# Patient Record
Sex: MALE | Race: WHITE | NOT HISPANIC OR LATINO | Employment: FULL TIME | URBAN - NONMETROPOLITAN AREA
[De-identification: names, ages, dates, MRNs, and addresses within clinical notes are randomized per-mention and may not be internally consistent; named-entity substitution may affect disease eponyms.]

---

## 2023-03-22 ENCOUNTER — OFFICE VISIT (OUTPATIENT)
Dept: FAMILY MEDICINE CLINIC | Facility: CLINIC | Age: 43
End: 2023-03-22

## 2023-03-22 VITALS
HEIGHT: 69 IN | OXYGEN SATURATION: 98 % | RESPIRATION RATE: 18 BRPM | HEART RATE: 71 BPM | SYSTOLIC BLOOD PRESSURE: 128 MMHG | DIASTOLIC BLOOD PRESSURE: 82 MMHG | WEIGHT: 284.4 LBS | TEMPERATURE: 98.3 F | BODY MASS INDEX: 42.12 KG/M2

## 2023-03-22 DIAGNOSIS — T65.91XA ACCIDENTAL INGESTION OF SUBSTANCE, INITIAL ENCOUNTER: ICD-10-CM

## 2023-03-22 DIAGNOSIS — K22.70 BARRETT'S ESOPHAGUS WITHOUT DYSPLASIA: ICD-10-CM

## 2023-03-22 DIAGNOSIS — M25.562 CHRONIC PAIN OF LEFT KNEE: ICD-10-CM

## 2023-03-22 DIAGNOSIS — F33.9 RECURRENT DEPRESSION (HCC): ICD-10-CM

## 2023-03-22 DIAGNOSIS — M54.50 CHRONIC BILATERAL LOW BACK PAIN WITHOUT SCIATICA: ICD-10-CM

## 2023-03-22 DIAGNOSIS — K42.9 UMBILICAL HERNIA WITHOUT OBSTRUCTION AND WITHOUT GANGRENE: ICD-10-CM

## 2023-03-22 DIAGNOSIS — F41.1 GAD (GENERALIZED ANXIETY DISORDER): ICD-10-CM

## 2023-03-22 DIAGNOSIS — Z13.29 SCREENING FOR THYROID DISORDER: ICD-10-CM

## 2023-03-22 DIAGNOSIS — G89.29 CHRONIC PAIN OF LEFT KNEE: ICD-10-CM

## 2023-03-22 DIAGNOSIS — Z23 ENCOUNTER FOR IMMUNIZATION: ICD-10-CM

## 2023-03-22 DIAGNOSIS — Z11.59 NEED FOR HEPATITIS C SCREENING TEST: ICD-10-CM

## 2023-03-22 DIAGNOSIS — Z76.89 ESTABLISHING CARE WITH NEW DOCTOR, ENCOUNTER FOR: Primary | ICD-10-CM

## 2023-03-22 DIAGNOSIS — Z13.220 SCREENING, LIPID: ICD-10-CM

## 2023-03-22 DIAGNOSIS — Z11.4 SCREENING FOR HIV (HUMAN IMMUNODEFICIENCY VIRUS): ICD-10-CM

## 2023-03-22 DIAGNOSIS — E66.01 MORBID OBESITY (HCC): ICD-10-CM

## 2023-03-22 DIAGNOSIS — G89.29 CHRONIC BILATERAL LOW BACK PAIN WITHOUT SCIATICA: ICD-10-CM

## 2023-03-22 DIAGNOSIS — K29.50 OTHER CHRONIC GASTRITIS WITHOUT HEMORRHAGE: ICD-10-CM

## 2023-03-22 PROBLEM — K29.70 GASTRITIS: Status: ACTIVE | Noted: 2023-03-22

## 2023-03-22 RX ORDER — ESCITALOPRAM OXALATE 20 MG/1
20 TABLET ORAL DAILY
COMMUNITY

## 2023-03-22 NOTE — LETTER
March 22, 2023     Patient: Dante Chadwick  YOB: 1980  Date of Visit: 3/22/2023      To Whom it May Concern:    Dante Chadwick is under my professional care  Chang Garrido was seen in my office on 3/22/2023  Chang Garrido may return to work on 3/27/22  If you have any questions or concerns, please don't hesitate to call           Sincerely,          Navjot Gould, DO
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
## 2023-03-24 ENCOUNTER — APPOINTMENT (OUTPATIENT)
Dept: RADIOLOGY | Facility: CLINIC | Age: 43
End: 2023-03-24

## 2023-03-24 ENCOUNTER — APPOINTMENT (OUTPATIENT)
Dept: LAB | Facility: CLINIC | Age: 43
End: 2023-03-24

## 2023-03-24 DIAGNOSIS — F33.9 RECURRENT DEPRESSION (HCC): ICD-10-CM

## 2023-03-24 DIAGNOSIS — G89.29 CHRONIC PAIN OF LEFT KNEE: ICD-10-CM

## 2023-03-24 DIAGNOSIS — G89.29 CHRONIC PAIN OF LEFT KNEE: Primary | ICD-10-CM

## 2023-03-24 DIAGNOSIS — M25.562 CHRONIC PAIN OF LEFT KNEE: ICD-10-CM

## 2023-03-24 DIAGNOSIS — M25.562 CHRONIC PAIN OF LEFT KNEE: Primary | ICD-10-CM

## 2023-03-24 DIAGNOSIS — Z13.29 SCREENING FOR THYROID DISORDER: ICD-10-CM

## 2023-03-24 DIAGNOSIS — E66.01 MORBID OBESITY (HCC): ICD-10-CM

## 2023-03-24 DIAGNOSIS — Z13.220 SCREENING, LIPID: ICD-10-CM

## 2023-03-24 DIAGNOSIS — K29.50 OTHER CHRONIC GASTRITIS WITHOUT HEMORRHAGE: ICD-10-CM

## 2023-03-24 DIAGNOSIS — F41.1 GAD (GENERALIZED ANXIETY DISORDER): ICD-10-CM

## 2023-03-24 LAB
ALBUMIN SERPL BCP-MCNC: 3.8 G/DL (ref 3.5–5)
ALP SERPL-CCNC: 61 U/L (ref 46–116)
ALT SERPL W P-5'-P-CCNC: 39 U/L (ref 12–78)
ANION GAP SERPL CALCULATED.3IONS-SCNC: 4 MMOL/L (ref 4–13)
AST SERPL W P-5'-P-CCNC: 15 U/L (ref 5–45)
BASOPHILS # BLD AUTO: 0.08 THOUSANDS/ÂΜL (ref 0–0.1)
BASOPHILS NFR BLD AUTO: 2 % (ref 0–1)
BILIRUB SERPL-MCNC: 0.54 MG/DL (ref 0.2–1)
BUN SERPL-MCNC: 16 MG/DL (ref 5–25)
CALCIUM SERPL-MCNC: 8.7 MG/DL (ref 8.3–10.1)
CHLORIDE SERPL-SCNC: 108 MMOL/L (ref 96–108)
CHOLEST SERPL-MCNC: 119 MG/DL
CO2 SERPL-SCNC: 26 MMOL/L (ref 21–32)
CREAT SERPL-MCNC: 0.84 MG/DL (ref 0.6–1.3)
EOSINOPHIL # BLD AUTO: 0.21 THOUSAND/ÂΜL (ref 0–0.61)
EOSINOPHIL NFR BLD AUTO: 4 % (ref 0–6)
ERYTHROCYTE [DISTWIDTH] IN BLOOD BY AUTOMATED COUNT: 12.4 % (ref 11.6–15.1)
GFR SERPL CREATININE-BSD FRML MDRD: 106 ML/MIN/1.73SQ M
GLUCOSE P FAST SERPL-MCNC: 99 MG/DL (ref 65–99)
HCT VFR BLD AUTO: 47.2 % (ref 36.5–49.3)
HDLC SERPL-MCNC: 59 MG/DL
HGB BLD-MCNC: 15.7 G/DL (ref 12–17)
IMM GRANULOCYTES # BLD AUTO: 0.02 THOUSAND/UL (ref 0–0.2)
IMM GRANULOCYTES NFR BLD AUTO: 0 % (ref 0–2)
LDLC SERPL CALC-MCNC: 46 MG/DL (ref 0–100)
LYMPHOCYTES # BLD AUTO: 1.39 THOUSANDS/ÂΜL (ref 0.6–4.47)
LYMPHOCYTES NFR BLD AUTO: 26 % (ref 14–44)
MCH RBC QN AUTO: 29.8 PG (ref 26.8–34.3)
MCHC RBC AUTO-ENTMCNC: 33.3 G/DL (ref 31.4–37.4)
MCV RBC AUTO: 90 FL (ref 82–98)
MONOCYTES # BLD AUTO: 0.78 THOUSAND/ÂΜL (ref 0.17–1.22)
MONOCYTES NFR BLD AUTO: 15 % (ref 4–12)
NEUTROPHILS # BLD AUTO: 2.83 THOUSANDS/ÂΜL (ref 1.85–7.62)
NEUTS SEG NFR BLD AUTO: 53 % (ref 43–75)
NRBC BLD AUTO-RTO: 0 /100 WBCS
PLATELET # BLD AUTO: 293 THOUSANDS/UL (ref 149–390)
PMV BLD AUTO: 9.7 FL (ref 8.9–12.7)
POTASSIUM SERPL-SCNC: 4.2 MMOL/L (ref 3.5–5.3)
PROT SERPL-MCNC: 6.9 G/DL (ref 6.4–8.4)
RBC # BLD AUTO: 5.26 MILLION/UL (ref 3.88–5.62)
SODIUM SERPL-SCNC: 138 MMOL/L (ref 135–147)
TRIGL SERPL-MCNC: 69 MG/DL
TSH SERPL DL<=0.05 MIU/L-ACNC: 1.28 UIU/ML (ref 0.45–4.5)
WBC # BLD AUTO: 5.31 THOUSAND/UL (ref 4.31–10.16)

## 2023-04-06 ENCOUNTER — TELEPHONE (OUTPATIENT)
Dept: FAMILY MEDICINE CLINIC | Facility: CLINIC | Age: 43
End: 2023-04-06

## 2023-04-06 NOTE — TELEPHONE ENCOUNTER
Pt wife asking if you can call her regarding her husbands hospital stay/surgery  Shes confused and has questions

## 2023-04-06 NOTE — TELEPHONE ENCOUNTER
Please let her know I am out of office until next week and will phone her upon my return -- next Thursday    Wai Castillo, DO

## 2023-04-14 NOTE — TELEPHONE ENCOUNTER
I personally phoned her  She has f/u with ortho and needs Rheumatology f/u  RF was positive -- needs to see Rheumatology  She will discuss this with ortho at their f/u visit  He can see me in May as scheduled  Sleep Referral done        Rosalina Yepez,

## 2023-05-11 ENCOUNTER — RA CDI HCC (OUTPATIENT)
Dept: OTHER | Facility: HOSPITAL | Age: 43
End: 2023-05-11

## 2023-05-11 NOTE — PROGRESS NOTES
[facility]  coding opportunities       Chart reviewed, no opportunity found: CHART REVIEWED, NO OPPORTUNITY FOUND        Patients Insurance        Commercial Insurance: [employer]

## 2023-05-18 ENCOUNTER — OFFICE VISIT (OUTPATIENT)
Dept: FAMILY MEDICINE CLINIC | Facility: CLINIC | Age: 43
End: 2023-05-18

## 2023-05-18 VITALS
OXYGEN SATURATION: 99 % | WEIGHT: 290.4 LBS | BODY MASS INDEX: 42.88 KG/M2 | DIASTOLIC BLOOD PRESSURE: 82 MMHG | TEMPERATURE: 98.6 F | HEART RATE: 79 BPM | RESPIRATION RATE: 18 BRPM | SYSTOLIC BLOOD PRESSURE: 122 MMHG

## 2023-05-18 DIAGNOSIS — F33.9 RECURRENT DEPRESSION (HCC): ICD-10-CM

## 2023-05-18 DIAGNOSIS — K22.70 BARRETT'S ESOPHAGUS WITHOUT DYSPLASIA: ICD-10-CM

## 2023-05-18 DIAGNOSIS — R73.03 PRE-DIABETES: ICD-10-CM

## 2023-05-18 DIAGNOSIS — Z11.4 SCREENING FOR HIV (HUMAN IMMUNODEFICIENCY VIRUS): ICD-10-CM

## 2023-05-18 DIAGNOSIS — E66.01 MORBID OBESITY (HCC): ICD-10-CM

## 2023-05-18 DIAGNOSIS — Z00.00 ANNUAL PHYSICAL EXAM: Primary | ICD-10-CM

## 2023-05-18 DIAGNOSIS — Z12.5 SCREENING FOR MALIGNANT NEOPLASM OF PROSTATE: ICD-10-CM

## 2023-05-18 DIAGNOSIS — F41.1 GAD (GENERALIZED ANXIETY DISORDER): ICD-10-CM

## 2023-05-18 DIAGNOSIS — Z80.42 FAMILY HISTORY OF PROSTATE CANCER: ICD-10-CM

## 2023-05-18 DIAGNOSIS — T65.91XS: ICD-10-CM

## 2023-05-18 RX ORDER — IBUPROFEN 600 MG/1
600 TABLET ORAL EVERY 6 HOURS PRN
COMMUNITY
Start: 2023-04-22 | End: 2023-05-18 | Stop reason: ALTCHOICE
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
## 2023-05-20 ENCOUNTER — TELEPHONE (OUTPATIENT)
Dept: OTHER | Facility: OTHER | Age: 43
End: 2023-05-20

## 2023-05-20 DIAGNOSIS — F98.8 ATTENTION DEFICIT DISORDER (ADD) WITHOUT HYPERACTIVITY: Primary | ICD-10-CM

## 2023-05-20 NOTE — TELEPHONE ENCOUNTER
Patient would like a call back from the doctor to discuss a new medication Strattera (Atomoxetine) he would to try

## 2023-05-24 RX ORDER — ATOMOXETINE 40 MG/1
40 CAPSULE ORAL DAILY
Qty: 30 CAPSULE | Refills: 1 | Status: SHIPPED | OUTPATIENT
Start: 2023-05-24

## 2023-05-24 NOTE — TELEPHONE ENCOUNTER
"I spoke to pt  Having difficulty with focus and attn  Has been like this \"my whole life  \"  Would like consideration for Strattera  Medication sent to the pharmacy  Staff, please phone him later this afternoon - he is a   I should see him for f/u in early July        Tamra [address], DO    "

## 2023-06-16 ENCOUNTER — TELEPHONE (OUTPATIENT)
Dept: FAMILY MEDICINE CLINIC | Facility: CLINIC | Age: 43
End: 2023-06-16

## 2023-06-16 NOTE — LETTER
June 16, 2023     Patient: Unique Mao  YOB: 1980  Date of Visit: 6/16/2023      To Whom it May Concern:    Unique Mao is under my professional care  Elton Tess was seen in my office on 6/16/2023  Elton Pulido may return to work on 6/16/23   Excuse him from Tues-Thursday 6/13-6/15 due to illness  If you have any questions or concerns, please don't hesitate to call           Sincerely,          Luisa Art, DO

## 2023-06-16 NOTE — TELEPHONE ENCOUNTER
Pt calling asking for a note for work  Missed work Tues-Thursday 6/13-6/15  States he had the flu and didn't come in because he knew it was viral but [facility] is asking for a note       Asking if note can be faxed to [address] [facility] at [phone]

## 2023-07-05 ENCOUNTER — OFFICE VISIT (OUTPATIENT)
Dept: FAMILY MEDICINE CLINIC | Facility: CLINIC | Age: 43
End: 2023-07-05
Payer: COMMERCIAL

## 2023-07-05 VITALS
WEIGHT: 292.2 LBS | SYSTOLIC BLOOD PRESSURE: 128 MMHG | TEMPERATURE: 98.3 F | RESPIRATION RATE: 18 BRPM | BODY MASS INDEX: 43.15 KG/M2 | DIASTOLIC BLOOD PRESSURE: 88 MMHG | HEART RATE: 84 BPM | OXYGEN SATURATION: 98 %

## 2023-07-05 DIAGNOSIS — F98.8 ATTENTION DEFICIT DISORDER (ADD) WITHOUT HYPERACTIVITY: Primary | ICD-10-CM

## 2023-07-05 DIAGNOSIS — F33.9 RECURRENT DEPRESSION (HCC): ICD-10-CM

## 2023-07-05 DIAGNOSIS — F41.1 GAD (GENERALIZED ANXIETY DISORDER): ICD-10-CM

## 2023-07-05 DIAGNOSIS — M17.12 ARTHRITIS OF LEFT KNEE: ICD-10-CM

## 2023-07-05 PROCEDURE — 99214 OFFICE O/P EST MOD 30 MIN: CPT | Performed by: FAMILY MEDICINE

## 2023-07-05 RX ORDER — ATOMOXETINE 18 MG/1
18 CAPSULE ORAL DAILY
Qty: 30 CAPSULE | Refills: 2 | Status: SHIPPED | OUTPATIENT
Start: 2023-07-05

## 2023-07-05 RX ORDER — ATOMOXETINE 40 MG/1
40 CAPSULE ORAL EVERY MORNING
Qty: 90 CAPSULE | Refills: 1 | Status: SHIPPED | OUTPATIENT
Start: 2023-07-05

## 2023-07-05 NOTE — PATIENT INSTRUCTIONS
Knee Exercises   AMBULATORY CARE:   What you need to know about knee exercises:  Knee exercises help strengthen the muscles around your knee. Strong muscles can help reduce pain and decrease your risk of future injury. Knee exercises also help you heal after an injury or surgery. These are beginning exercises. Ask your healthcare provider if you need to see a physical therapist for more advanced exercises. General guidelines for knee exercises:   Start slowly. As you get stronger, you may be able to do more sets of each exercise or add weights. Stop if you feel pain. It is normal to feel some discomfort at first, but you should not feel pain. Tell your provider or physical therapist if you have pain while you exercise. Regular exercise will help decrease your discomfort over time. Do the exercises on both legs. Do this so both knees remain strong. Warm up before you do knee exercises. Walk or ride a stationary bike for 5 or 10 minutes to warm your muscles. How to perform knee stretches safely:  Always stretch before you do strengthening exercises. Do these stretching exercises again after you do the strengthening exercises. Do these stretches 4 or 5 days a week, or as directed. Standing calf stretch: Face a wall and place both palms flat on the wall, or hold the back of a chair for balance. Keep a slight bend in your knees. Take a big step backward with one leg. Keep your other leg directly under you. Keep both heels flat and press your hips forward. Hold the stretch for 30 seconds, and then relax for 30 seconds. Switch legs. Repeat 2 or 3 times on each leg. Standing quadriceps stretch:  Stand and place one hand against a wall or hold the back of a chair for balance. With your weight on one leg, bend your other leg and grab your ankle. Bring your heel toward your buttocks. Hold the stretch for 30 to 60 seconds. Switch legs. Repeat 2 or 3 times on each leg.          Sitting hamstring stretch:  Sit with both legs straight in front of you. Do not point or flex your toes. Place your palms on the floor and slide your hands forward until you feel the stretch. Do not round your back. Hold the stretch for 30 seconds. Repeat 2 or 3 times. How to perform knee strengthening exercises safely:  Do these exercises 4 or 5 days a week, or as directed. Standing half squats:  Stand with your feet shoulder-width apart. Lean your back against a wall or hold the back of a chair for balance, if needed. Slowly sit down about 10 inches, as if you are going to sit in a chair. Your body weight should be mostly over your heels. Hold the squat for 5 seconds, then rise to a standing position. Do 3 sets of 10 squats to strengthen your buttocks and thighs. Standing hamstring curls: Face a wall and place both palms flat on the wall, or hold the back of a chair for balance. With your weight on one leg, lift your other foot as close to your buttocks as you can. Hold for 5 seconds and then lower your leg. Do 2 sets of 10 curls on each leg. This exercise strengthens the muscles in the back of your thigh. Standing calf raises:  Face a wall and place both palms flat on the wall, or hold the back of a chair for balance. Stand up straight, and do not lean. Place all your weight on one leg by lifting the other foot off the floor. Raise the heel of the foot that is on the floor as high as you can and then lower it. Do 2 sets of 10 calf raises on each leg to strengthen your calf muscles. Straight leg lifts:  Lie on your stomach with straight legs. Fold your arms in front of you and rest your head in your arms. Tighten your leg muscles and raise one leg as high as you can. Hold for 5 seconds, then lower your leg. Do 2 sets of 10 lifts on each leg to strengthen your buttocks. Sitting leg lifts:  Sit in a chair. Slowly straighten and raise one leg. Squeeze your thigh muscles and hold for 5 seconds. Relax and return your foot to the floor. Do 2 sets of 10 lifts on each leg. This helps strengthen the muscles in the front of your thigh. Call your doctor or physical therapist if:   You have new pain or your pain becomes worse. You have questions or concerns about your condition or care. © Copyright Holley Pierce 2022 Information is for End User's use only and may not be sold, redistributed or otherwise used for commercial purposes. The above information is an  only. It is not intended as medical advice for individual conditions or treatments. Talk to your doctor, nurse or pharmacist before following any medical regimen to see if it is safe and effective for you.

## 2023-07-05 NOTE — PROGRESS NOTES
[address] [facility]    NAME: Radha Bueno  AGE: 38 y.o. SEX: male  : 1980     DATE: 2023     Assessment and Plan:     1. Attention deficit disorder (ADD) without hyperactivity  - symptoms improved. Will bump to 40 in AM and 18 mg in the afternoon. He is having some urinary hesitancy, nothing significant at the moment. Will back off if worsening.    - atoMOXetine (STRATTERA) 18 mg capsule; Take 1 capsule (18 mg total) by mouth daily Late afternoon/early evening  Dispense: 30 capsule; Refill: 2  - atoMOXetine (STRATTERA) 40 mg capsule; Take 1 capsule (40 mg total) by mouth every morning  Dispense: 90 capsule; Refill: 1    2. Recurrent depression ([address])  Stable on lexapro. 3. AIYANA (generalized anxiety disorder)  Stable on lexapro. No si/hi. 4. Arthritis of left knee  - XR on file. Nothing significant on exam today. He is give a Home Exercise Plan. He is more active now working at [employer] and this has been helpful for him as well. We will cont to monitor. 3 month f/u for f/u ADHD and L knee pain     Patient/Caretaker verbalized understanding and were in agreement with today's assessment and plan. Time was taken to address any questions patient/caretaker had. Indication/Risks/Benefits of medication(s) as prescribed were discussed with the patient/caretaker. The patient verbalized understanding and agreement and elects to take medications as prescribed. Time was taken to answer any questions the patient/caretaker may have had. BMI Counseling: There is no height or weight on file to calculate BMI. The BMI is above normal. Nutrition recommendations include decreasing portion sizes, encouraging healthy choices of fruits and vegetables, decreasing fast food intake, consuming healthier snacks, limiting drinks that contain sugar, reducing intake of saturated and trans fat and reducing intake of cholesterol.  Exercise recommendations include exercising 3-5 times per week and strength training exercises. Rationale for BMI follow-up plan is due to patient being overweight or obese. Chief Complaint:     Chief Complaint   Patient presents with   • Follow-up      History of Present Illness:     Is following up with Rheum for his R shoulder. Still doing PT for this. Has appt with Gen Sx for his umbilical hernia - recommended weight loss prior to sx. He is working on this. No significant pain at the moment. Will check in f/u about Sleep referral.      GI appt later this month. [place] No changes to report in his bowel  He does have h/o Paul's. He does feel bloating. No discrete heartburn. No melena. ADHD - started Strattera in May. Feels some urinary hesitancy and dec flow in his stream but overall notes improvement in his symptoms. Would like to cont. Open to dosing in afternoon/evening. 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 L knee pain -- not a new c/o'. Had XR done. Feels weak and like it will give way at times. Better now that he Is more active with his job. Worse when a lot of up and down and twisting. No color change or swelling. Did have an injury many years ago where he twisted the knee. Not waking him up from sleep. Diet and Physical Activity  · Diet/Nutrition: working on this. · Exercise: walking, being more active      Depression Screening  PHQ-2/9 Depression Screening         General Health  · Sleep: lexapro helping him and melatonin prn. · Hearing: there is a ringing in his hears -- this is chronic in nature. · Vision: vision problems: wearing glasses; no major changes. · Dental: regular dental visits.    Has an appt in July -- will have extractions     [facility]  · Symptoms include: none     Review of Systems:     Review of Systems   All other systems reviewed and are negative. Past Medical History:     Past Medical History:   Diagnosis Date   • Anxiety    • Depression       Past Surgical History:     Past Surgical History:   Procedure Laterality Date   • APPENDECTOMY  2019   • HERNIA REPAIR     • SHOULDER SURGERY        Family History:     Family History   Problem Relation Age of Onset   • Breast cancer Mother    • Diabetes Mother    • Bone cancer Father    • Heart attack Paternal Grandmother    • Breast cancer Maternal Aunt       Social History:     Social History     Socioeconomic History   • Marital status: /Civil Union     Spouse name: None   • Number of children: None   • Years of education: None   • Highest education level: None   Occupational History   • None   Tobacco Use   • Smoking status: Never   • Smokeless tobacco: Never   Vaping Use   • Vaping Use: Never used   Substance and Sexual Activity   • Alcohol use: Not Currently   • Drug use: Never   • Sexual activity: Yes     Partners: Female   Other Topics Concern   • None   Social History Narrative   • None     Social Determinants of Health     Financial Resource Strain: Not on file   Food Insecurity: Not on file   Transportation Needs: Not on file   Physical Activity: Not on file   Stress: Not on file   Social Connections: Not on file   Intimate Partner Violence: Not on file   Housing Stability: Not on file      Current Medications:     Current Outpatient Medications   Medication Sig Dispense Refill   • atoMOXetine (STRATTERA) 18 mg capsule Take 1 capsule (18 mg total) by mouth daily Late afternoon/early evening 30 capsule 2   • atoMOXetine (STRATTERA) 40 mg capsule Take 1 capsule (40 mg total) by mouth every morning 90 capsule 1   • escitalopram (LEXAPRO) 20 mg tablet Take 20 mg by mouth daily       No current facility-administered medications for this visit.       Allergies:     No Known Allergies   Physical Exam:     /88   Pulse 84   Temp 98.3 °F (36.8 °C) (Temporal)   Resp 18   Wt 133 kg (292 lb 3.2 oz)   SpO2 98%   BMI 43.15 kg/m²     Physical Exam  Vitals and nursing note reviewed. Constitutional:       Appearance: He is obese. HENT:      Head: Normocephalic and atraumatic. Eyes:      Conjunctiva/sclera: Conjunctivae normal.      Pupils: Pupils are equal, round, and reactive to light. Cardiovascular:      Rate and Rhythm: Normal rate and regular rhythm. Heart sounds: Normal heart sounds. Pulmonary:      Effort: Pulmonary effort is normal.      Breath sounds: Normal breath sounds. No wheezing, rhonchi or rales. Abdominal:      General: Bowel sounds are normal.      Palpations: Abdomen is soft. Musculoskeletal:      Cervical back: Neck supple. Comments: There is ttp over the Lateral Joint line of L knee  No gross abnormality to inspection b/l knee  There is FROM though more painful with near end range flexion  Strength intact to b/l LE  2+ DP pulses  There is some palpable grinding with patellar testing; other special testing wnl    Lymphadenopathy:      Cervical: No cervical adenopathy. Skin:     General: Skin is warm and dry. Neurological:      General: No focal deficit present. Mental Status: He is alert and oriented to person, place, and time. Psychiatric:         Mood and Affect: Mood normal.         Behavior: Behavior normal.         Thought Content:  Thought content normal.         Judgment: Judgment normal.          Emily Cid, DO  [facility]

## 2023-07-13 ENCOUNTER — OFFICE VISIT (OUTPATIENT)
Dept: FAMILY MEDICINE CLINIC | Facility: CLINIC | Age: 43
End: 2023-07-13
Payer: COMMERCIAL

## 2023-07-13 VITALS
RESPIRATION RATE: 18 BRPM | BODY MASS INDEX: 42.71 KG/M2 | HEART RATE: 92 BPM | OXYGEN SATURATION: 99 % | WEIGHT: 289.2 LBS | TEMPERATURE: 98 F | DIASTOLIC BLOOD PRESSURE: 80 MMHG | SYSTOLIC BLOOD PRESSURE: 126 MMHG

## 2023-07-13 DIAGNOSIS — H93.13 TINNITUS OF BOTH EARS: ICD-10-CM

## 2023-07-13 DIAGNOSIS — H61.22 IMPACTED CERUMEN OF LEFT EAR: Primary | ICD-10-CM

## 2023-07-13 DIAGNOSIS — H91.93 BILATERAL HEARING LOSS, UNSPECIFIED HEARING LOSS TYPE: ICD-10-CM

## 2023-07-13 PROCEDURE — 99214 OFFICE O/P EST MOD 30 MIN: CPT | Performed by: FAMILY MEDICINE

## 2023-07-13 PROCEDURE — 69209 REMOVE IMPACTED EAR WAX UNI: CPT | Performed by: FAMILY MEDICINE

## 2023-07-13 NOTE — PROGRESS NOTES
Name: Maria Eugenia Soni      : 1980      MRN: [id number]  Encounter Provider: Tamika Dong DO  Encounter Date: 2023   Encounter department: [address]     1. Impacted cerumen of left ear  - removal done successfully. Tolerated well. 2. Tinnitus of both ears  Will send to audiology. Had in [address] [place] [address] but nothing here and it is persistent/worsening a bit in terms of hearing loss. - Ambulatory Referral to Audiology; Future    3. Bilateral hearing loss, unspecified hearing loss type  - Ambulatory Referral to Audiology; Future    Ear cerumen removal    Date/Time: 2023 10:00 AM    Performed by: Tamika Dong DO  Authorized by: Tamika Dong DO  Universal Protocol:  Consent: Verbal consent obtained. Risks and benefits: risks, benefits and alternatives were discussed  Consent given by: patient  Time out: Immediately prior to procedure a "time out" was called to verify the correct patient, procedure, equipment, support staff and site/side marked as required. Patient understanding: patient states understanding of the procedure being performed  Patient consent: the patient's understanding of the procedure matches consent given  Site marked: the operative site was marked  Patient identity confirmed: verbally with patient      Patient location:  Clinic  Procedure details:     Location:  L ear    Procedure type: irrigation only      Approach:  Natural orifice  Post-procedure details:     Complication:  None    Hearing quality:  Improved    Patient tolerance of procedure: Tolerated well, no immediate complications      Return for keep appt as scheduled . Patient/Caretaker verbalized understanding and were in agreement with today's assessment and plan. Time was taken to address any questions patient/caretaker had. Indication/Risks/Benefits of medication(s) as prescribed were discussed with the patient/caretaker.   The patient verbalized understanding and agreement and elects to take medications as prescribed. Time was taken to answer any questions the patient/caretaker may have had. Chief Complaint   Patient presents with   • Earache       Subjective      As at the pool at [facility] and since then, has a blocked ear. This is on the L. Tried to remove at home  Not successful. Feels there is something in there. No f/c/s. Has h/o tinnitus and hearing loss. Worsening a bit. Would like to see Audiology here. Review of Systems   All other systems reviewed and are negative. Current Outpatient Medications on File Prior to Visit   Medication Sig   • atoMOXetine (STRATTERA) 18 mg capsule Take 1 capsule (18 mg total) by mouth daily Late afternoon/early evening   • atoMOXetine (STRATTERA) 40 mg capsule Take 1 capsule (40 mg total) by mouth every morning   • escitalopram (LEXAPRO) 20 mg tablet Take 20 mg by mouth daily       Objective     /80   Pulse 92   Temp 98 °F (36.7 °C) (Temporal)   Resp 18   Wt 131 kg (289 lb 3.2 oz)   SpO2 99%   BMI 42.71 kg/m²     Physical Exam  Vitals and nursing note reviewed. Constitutional:       Appearance: Normal appearance. HENT:      Head: Normocephalic and atraumatic. Right Ear: Tympanic membrane and ear canal normal.      Ears:      Comments: L cerumen impaction  Post removal, normal TM    Fluid behind TMs b/l   Eyes:      Conjunctiva/sclera: Conjunctivae normal.      Pupils: Pupils are equal, round, and reactive to light. Cardiovascular:      Rate and Rhythm: Normal rate. Pulmonary:      Effort: Pulmonary effort is normal.   Musculoskeletal:      Cervical back: Neck supple. Neurological:      General: No focal deficit present. Mental Status: He is alert and oriented to person, place, and time. Psychiatric:         Mood and Affect: Mood normal.         Behavior: Behavior normal.         Thought Content:  Thought content normal.         Judgment: Judgment normal. [facility],

## 2023-10-05 ENCOUNTER — OFFICE VISIT (OUTPATIENT)
Dept: FAMILY MEDICINE CLINIC | Facility: CLINIC | Age: 43
End: 2023-10-05
Payer: COMMERCIAL

## 2023-10-05 VITALS
RESPIRATION RATE: 18 BRPM | TEMPERATURE: 97.8 F | SYSTOLIC BLOOD PRESSURE: 130 MMHG | WEIGHT: 292.2 LBS | DIASTOLIC BLOOD PRESSURE: 82 MMHG | OXYGEN SATURATION: 96 % | HEART RATE: 93 BPM | BODY MASS INDEX: 43.15 KG/M2

## 2023-10-05 DIAGNOSIS — R06.83 SNORING: ICD-10-CM

## 2023-10-05 DIAGNOSIS — R40.0 DAYTIME SOMNOLENCE: ICD-10-CM

## 2023-10-05 DIAGNOSIS — E55.9 VITAMIN D DEFICIENCY: ICD-10-CM

## 2023-10-05 DIAGNOSIS — F98.8 ATTENTION DEFICIT DISORDER (ADD) WITHOUT HYPERACTIVITY: ICD-10-CM

## 2023-10-05 DIAGNOSIS — F41.1 GAD (GENERALIZED ANXIETY DISORDER): ICD-10-CM

## 2023-10-05 DIAGNOSIS — M17.12 ARTHRITIS OF LEFT KNEE: ICD-10-CM

## 2023-10-05 DIAGNOSIS — F33.9 RECURRENT DEPRESSION (HCC): Primary | ICD-10-CM

## 2023-10-05 DIAGNOSIS — R73.03 PRE-DIABETES: ICD-10-CM

## 2023-10-05 PROCEDURE — 99214 OFFICE O/P EST MOD 30 MIN: CPT | Performed by: FAMILY MEDICINE

## 2023-10-05 RX ORDER — ERGOCALCIFEROL 1.25 MG/1
50000 CAPSULE ORAL 2 TIMES WEEKLY
Qty: 24 CAPSULE | Refills: 0 | Status: SHIPPED | OUTPATIENT
Start: 2023-10-05 | End: 2023-12-26

## 2023-10-05 NOTE — LETTER
October 5, 2023     Patient: Selene Mendez  YOB: 1980  Date of Visit: 10/5/2023      To Whom it May Concern:    Stacey Gutiérrez is under my professional care. Ottis Litten was seen in my office on 10/5/2023. Ottis Litten may return to work on 10/9/23 . Please excuse him on today's date and tomorrow, 10/6/23. If you have any questions or concerns, please don't hesitate to call.          Sincerely,          Judy Salas, DO

## 2023-10-05 NOTE — PROGRESS NOTES
[address] [facility]    NAME: Francisco Coates  AGE: 38 y.o. SEX: male  : 1980     DATE: 10/5/2023     Assessment and Plan:     1. Vitamin D deficiency  - significantly low on labs done in the Spring. Will supplement high dose twice weekly. He is on board. Can be rechecked in a few months. - ergocalciferol (VITAMIN D2) 50,000 units; Take 1 capsule (50,000 Units total) by mouth 2 (two) times a week for 24 doses  Dispense: 24 capsule; Refill: 0    2. Snoring  Agreeable to sleep study. He is with fatigue/not feeling well rested. Gaining weight, etc.  Certainly at risk. - Ambulatory Referral to Sleep Medicine; Future    3. Daytime somnolence  - Ambulatory Referral to Sleep Medicine; Future    4. Attention deficit disorder (ADD) without hyperactivity  On Strattera and this has been helpful for him. Was having some urinary hesitancy symptoms, this is a bit better with the higher dose. Will cont to monitor. 5. AIYANA (generalized anxiety disorder)  Stressed of late. 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 Is gaining weight. Will cont lexapro at current dose. Will also get sleep study. 6. Recurrent depression ([address])  See above. 7. Pre-diabetes  Lab Results   Component Value Date    HGBA1C 5.7 (H) 2023   - Discussed the importance of maintaining a healthy lifestyle through heart healthy diet and exercise. 8. Arthritis of left knee  - improving. Return in about 3 months (around 2024) for est with Dr. Sheila Salas in [facility] . Patient/Caretaker verbalized understanding and were in agreement with today's assessment and plan. Time was taken to address any questions patient/caretaker had. Indication/Risks/Benefits of medication(s) as prescribed were discussed with the patient/caretaker.   The patient verbalized understanding and agreement and elects to take medications as prescribed. Time was taken to answer any questions the patient/caretaker may have had. BMI Counseling: Body mass index is 43.15 kg/m². The BMI is above normal. Nutrition recommendations include decreasing portion sizes, encouraging healthy choices of fruits and vegetables, decreasing fast food intake, consuming healthier snacks, limiting drinks that contain sugar, reducing intake of saturated and trans fat and reducing intake of cholesterol. Exercise recommendations include exercising 3-5 times per week and strength training exercises. Rationale for BMI follow-up plan is due to patient being overweight or obese. Chief Complaint:     Chief Complaint   Patient presents with   • Follow-up      History of Present Illness:     He had Covid shot early this week. He is feeling under the weather a bit today as well; he has some pnd. He is also with nausea. Is out of work and does not feel up to returning tomorrow. Just feeling a bit more down of late. Wife with him today. No f/c/s. He is gaining weight and is not feeling good about this. Admits his diet could be better. No intentional exercise. Plans to work on this. Saw Gen surgery for his umbilical hernia. Not surgical at the moment. No pain to report. He is agreeable to see sleep medicine. He is snoring, feeling excess daily fatigue. Did do referral in the past, not followed up on. Agreeable to revisit this. ADHD - started Strattera in May. Was feeling some urinary hesitancy and dec flow in his stream but overall notes improvement in his symptoms on the higher dose. Would like to cont. 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 L knee is feeling improved. XR on file. General Health  · Sleep: lexapro helping him and melatonin prn. · Hearing: there is a ringing in his hears -- this is chronic in nature. · Vision: vision problems: wearing glasses; no major changes.     · Dental: regular dental visits. Review of Systems:     Review of Systems   All other systems reviewed and are negative. Past Medical History:     Past Medical History:   Diagnosis Date   • Anxiety    • Depression       Past Surgical History:     Past Surgical History:   Procedure Laterality Date   • APPENDECTOMY  2019   • HERNIA REPAIR     • SHOULDER SURGERY        Family History:     Family History   Problem Relation Age of Onset   • Breast cancer Mother    • Diabetes Mother    • Bone cancer Father    • Heart attack Paternal Grandmother    • Breast cancer Maternal Aunt       Social History:     Social History     Socioeconomic History   • Marital status: /Civil Union     Spouse name: None   • Number of children: None   • Years of education: None   • Highest education level: None   Occupational History   • None   Tobacco Use   • Smoking status: Never   • Smokeless tobacco: Never   Vaping Use   • Vaping Use: Never used   Substance and Sexual Activity   • Alcohol use: Not Currently   • Drug use: Never   • Sexual activity: Yes     Partners: Female   Other Topics Concern   • None   Social History Narrative   • None     Social Determinants of Health     Financial Resource Strain: Not on file   Food Insecurity: Not on file   Transportation Needs: Not on file   Physical Activity: Not on file   Stress: Not on file   Social Connections: Not on file   Intimate Partner Violence: Not on file   Housing Stability: Not on file      Current Medications:     Current Outpatient Medications   Medication Sig Dispense Refill   • atoMOXetine (STRATTERA) 40 mg capsule Take 1 capsule (40 mg total) by mouth every morning 90 capsule 1   • ergocalciferol (VITAMIN D2) 50,000 units Take 1 capsule (50,000 Units total) by mouth 2 (two) times a week for 24 doses 24 capsule 0   • escitalopram (LEXAPRO) 20 mg tablet Take 20 mg by mouth daily       No current facility-administered medications for this visit.       Allergies:     No Known Allergies   Physical Exam:     /82   Pulse 93   Temp 97.8 °F (36.6 °C) (Temporal)   Resp 18   Wt 133 kg (292 lb 3.2 oz)   SpO2 96%   BMI 43.15 kg/m²     Physical Exam  Vitals and nursing note reviewed. Constitutional:       Appearance: He is obese. HENT:      Head: Normocephalic and atraumatic. Mouth/Throat:      Pharynx: Oropharynx is clear. Eyes:      Conjunctiva/sclera: Conjunctivae normal.      Pupils: Pupils are equal, round, and reactive to light. Neck:      Comments: Short, thick neck    Cardiovascular:      Rate and Rhythm: Normal rate and regular rhythm. Heart sounds: Normal heart sounds. Pulmonary:      Effort: Pulmonary effort is normal.      Breath sounds: Normal breath sounds. No wheezing, rhonchi or rales. Abdominal:      General: Bowel sounds are normal.      Palpations: Abdomen is soft. Musculoskeletal:         General: No swelling. Cervical back: Neck supple. Lymphadenopathy:      Cervical: No cervical adenopathy. Skin:     General: Skin is warm and dry. Neurological:      General: No focal deficit present. Mental Status: He is alert and oriented to person, place, and time. Psychiatric:         Mood and Affect: Mood normal.         Behavior: Behavior normal.         Thought Content:  Thought content normal.         Judgment: Judgment normal.          Emily Simon, DO  [address] [facility]

## 2023-12-24 DIAGNOSIS — E55.9 VITAMIN D DEFICIENCY: ICD-10-CM

## 2023-12-26 RX ORDER — ERGOCALCIFEROL 1.25 MG/1
CAPSULE ORAL
Qty: 24 CAPSULE | Refills: 0 | Status: SHIPPED | OUTPATIENT
Start: 2023-12-26

## 2023-12-29 ENCOUNTER — OFFICE VISIT (OUTPATIENT)
Dept: FAMILY MEDICINE CLINIC | Facility: CLINIC | Age: 43
End: 2023-12-29
Payer: COMMERCIAL

## 2023-12-29 VITALS
RESPIRATION RATE: 18 BRPM | HEART RATE: 94 BPM | DIASTOLIC BLOOD PRESSURE: 82 MMHG | TEMPERATURE: 97.4 F | OXYGEN SATURATION: 97 % | SYSTOLIC BLOOD PRESSURE: 128 MMHG

## 2023-12-29 DIAGNOSIS — J06.9 UPPER RESPIRATORY TRACT INFECTION, UNSPECIFIED TYPE: Primary | ICD-10-CM

## 2023-12-29 PROCEDURE — 99213 OFFICE O/P EST LOW 20 MIN: CPT

## 2023-12-29 RX ORDER — ALBUTEROL SULFATE 90 UG/1
2 AEROSOL, METERED RESPIRATORY (INHALATION) EVERY 6 HOURS PRN
Qty: 8 G | Refills: 0 | Status: SHIPPED | OUTPATIENT
Start: 2023-12-29

## 2023-12-29 RX ORDER — PREDNISONE 10 MG/1
TABLET ORAL DAILY
Qty: 15 TABLET | Refills: 0 | Status: SHIPPED | OUTPATIENT
Start: 2023-12-29 | End: 2024-01-03

## 2023-12-29 NOTE — PROGRESS NOTES
Name: Tristan Espino      : 1980      MRN: [id number]  Encounter Provider: Dmitriy Simpson PA-C  Encounter Date: 2023   Encounter department: [facility]    Assessment & Plan     1. Upper respiratory tract infection, unspecified type  Assessment & Plan:  Patient prescribed prednisone taper over 5 days and albuterol.  Patient is educated on side effects of each medication is told to call the office if these present.  Patient is also told to finish full course of amoxicillin.  Patient is educated on signs and symptoms of lower respiratory tract disease, is told to go to emergency department or call office if these present.  Patient is told to follow-up with office if symptoms persist or worsen in 1 week.    Orders:  -     predniSONE 10 mg tablet; Take 5 tablets (50 mg total) by mouth daily for 1 day, THEN 4 tablets (40 mg total) daily for 1 day, THEN 3 tablets (30 mg total) daily for 1 day, THEN 2 tablets (20 mg total) daily for 1 day, THEN 1 tablet (10 mg total) daily for 1 day.  -     albuterol (Ventolin HFA) 90 mcg/act inhaler; Inhale 2 puffs every 6 (six) hours as needed for wheezing       I have spent a total time of 20 minutes on 23 in caring for this patient including Prognosis, Risks and benefits of tx options, Instructions for management, Patient and family education, Importance of tx compliance, Risk factor reductions, Documenting in the medical record, Reviewing / ordering tests, medicine, procedures  , and Obtaining or reviewing history  .      Subjective      Patient is a 44yo male presenting with symptoms of a URI for 10 days.  Saturday went to urgent care.  Given amoxicillin 875mg bid for 10 days.      URI   This is a new problem. The current episode started 1 to 4 weeks ago (10 days ago.). The problem has been gradually worsening. There has been no fever. Associated symptoms include congestion, coughing, rhinorrhea, sinus pain, a sore throat and wheezing.  Pertinent negatives include no abdominal pain, chest pain, diarrhea, dysuria, ear pain, headaches, joint pain, joint swelling, nausea, neck pain, plugged ear sensation, rash, sneezing, swollen glands or vomiting. He has tried acetaminophen and NSAIDs (Amoxicillin 875mg bid x 10 days, prednisone 20mg bid x 5 days, benzonatate, and promethazine DM) for the symptoms. The treatment provided no relief.     Review of Systems   Constitutional:  Positive for fatigue. Negative for appetite change, chills, diaphoresis and fever.   HENT:  Positive for congestion, postnasal drip, rhinorrhea, sinus pressure, sinus pain and sore throat. Negative for ear discharge, ear pain and sneezing.    Eyes:  Negative for pain, discharge, redness, itching and visual disturbance.   Respiratory:  Positive for cough, shortness of breath and wheezing. Negative for apnea and chest tightness.    Cardiovascular:  Negative for chest pain, palpitations and leg swelling.   Gastrointestinal:  Negative for abdominal pain, blood in stool, constipation, diarrhea, nausea and vomiting.   Endocrine: Negative for cold intolerance, heat intolerance, polydipsia and polyuria.   Genitourinary:  Negative for dysuria, flank pain, frequency, hematuria and urgency.   Musculoskeletal:  Negative for arthralgias, back pain, joint pain, joint swelling, myalgias, neck pain and neck stiffness.   Skin:  Negative for rash.   Allergic/Immunologic: Negative for environmental allergies and food allergies.   Neurological:  Negative for dizziness, tremors, seizures, syncope, weakness, light-headedness, numbness and headaches.   Hematological:  Negative for adenopathy. Does not bruise/bleed easily.   Psychiatric/Behavioral:  Negative for agitation, confusion, decreased concentration, dysphoric mood, hallucinations, sleep disturbance and suicidal ideas. The patient is not nervous/anxious and is not hyperactive.        Current Outpatient Medications on File Prior to Visit    Medication Sig    atoMOXetine (STRATTERA) 40 mg capsule Take 1 capsule (40 mg total) by mouth every morning    ergocalciferol (VITAMIN D2) 50,000 units take 1 capsule by mouth TWO TIMES PER WEEK for 24 doses    escitalopram (LEXAPRO) 20 mg tablet Take 20 mg by mouth daily       Objective     /82   Pulse 94   Temp (!) 97.4 °F (36.3 °C) (Tympanic)   Resp 18   SpO2 97%     Physical Exam  Constitutional:       Appearance: Normal appearance. He is normal weight. He is not ill-appearing or toxic-appearing.   HENT:      Head: Normocephalic and atraumatic.      Right Ear: Tympanic membrane normal.      Left Ear: Tympanic membrane normal.      Nose: Congestion and rhinorrhea present.      Mouth/Throat:      Mouth: Mucous membranes are moist.      Pharynx: Oropharynx is clear. No oropharyngeal exudate or posterior oropharyngeal erythema.   Eyes:      Extraocular Movements: Extraocular movements intact.      Conjunctiva/sclera: Conjunctivae normal.      Pupils: Pupils are equal, round, and reactive to light.   Cardiovascular:      Rate and Rhythm: Normal rate and regular rhythm.      Pulses: Normal pulses.      Heart sounds: Normal heart sounds. No murmur heard.  Pulmonary:      Effort: Pulmonary effort is normal. No respiratory distress.      Breath sounds: No stridor. Wheezing present. No rhonchi or rales.   Chest:      Chest wall: No tenderness.   Abdominal:      General: Bowel sounds are normal.      Palpations: Abdomen is soft. There is no mass.      Tenderness: There is no abdominal tenderness.   Musculoskeletal:         General: No tenderness. Normal range of motion.      Cervical back: Normal range of motion and neck supple. No tenderness.      Right lower leg: No edema.      Left lower leg: No edema.   Lymphadenopathy:      Cervical: No cervical adenopathy.   Skin:     General: Skin is warm.      Capillary Refill: Capillary refill takes less than 2 seconds.      Findings: No erythema or rash.    Neurological:      General: No focal deficit present.      Mental Status: He is alert and oriented to person, place, and time. Mental status is at baseline.      Motor: No weakness.   Psychiatric:         Mood and Affect: Mood normal.         Behavior: Behavior normal.         Thought Content: Thought content normal.         Judgment: Judgment normal.       Dmitriy Simpson PA-C

## 2023-12-29 NOTE — ASSESSMENT & PLAN NOTE
Patient prescribed prednisone taper over 5 days and albuterol.  Patient is educated on side effects of each medication is told to call the office if these present.  Patient is also told to finish full course of amoxicillin.  Patient is educated on signs and symptoms of lower respiratory tract disease, is told to go to emergency department or call office if these present.  Patient is told to follow-up with office if symptoms persist or worsen in 1 week.

## 2024-01-08 ENCOUNTER — OFFICE VISIT (OUTPATIENT)
Dept: FAMILY MEDICINE CLINIC | Facility: CLINIC | Age: 44
End: 2024-01-08
Payer: COMMERCIAL

## 2024-01-08 VITALS
BODY MASS INDEX: 45.32 KG/M2 | TEMPERATURE: 98 F | WEIGHT: 306 LBS | HEART RATE: 84 BPM | OXYGEN SATURATION: 96 % | DIASTOLIC BLOOD PRESSURE: 90 MMHG | SYSTOLIC BLOOD PRESSURE: 120 MMHG | HEIGHT: 69 IN

## 2024-01-08 DIAGNOSIS — J06.9 UPPER RESPIRATORY TRACT INFECTION, UNSPECIFIED TYPE: Primary | ICD-10-CM

## 2024-01-08 PROCEDURE — 99213 OFFICE O/P EST LOW 20 MIN: CPT | Performed by: FAMILY MEDICINE

## 2024-01-08 RX ORDER — DOXYCYCLINE 100 MG/1
100 CAPSULE ORAL 2 TIMES DAILY
Qty: 14 CAPSULE | Refills: 0 | Status: SHIPPED | OUTPATIENT
Start: 2024-01-08 | End: 2024-01-12 | Stop reason: SDUPTHER

## 2024-01-08 RX ORDER — IPRATROPIUM BROMIDE AND ALBUTEROL SULFATE 2.5; .5 MG/3ML; MG/3ML
3 SOLUTION RESPIRATORY (INHALATION) 4 TIMES DAILY
Qty: 180 ML | Refills: 3 | Status: SHIPPED | OUTPATIENT
Start: 2024-01-08

## 2024-01-08 RX ORDER — PREDNISONE 10 MG/1
TABLET ORAL DAILY
Qty: 30 TABLET | Refills: 0 | Status: SHIPPED | OUTPATIENT
Start: 2024-01-08 | End: 2024-01-20

## 2024-01-08 NOTE — PROGRESS NOTES
Assessment/Plan:    No problem-specific Assessment & Plan notes found for this encounter.       Diagnoses and all orders for this visit:    Upper respiratory tract infection, unspecified type  -     ipratropium-albuterol (DUO-NEB) 0.5-2.5 mg/3 mL nebulizer solution; Take 3 mL by nebulization 4 (four) times a day  -     predniSONE 10 mg tablet; Take 4 tablets (40 mg total) by mouth daily for 3 days, THEN 3 tablets (30 mg total) daily for 3 days, THEN 2 tablets (20 mg total) daily for 3 days, THEN 1 tablet (10 mg total) daily for 3 days.  -     doxycycline monohydrate (MONODOX) 100 mg capsule; Take 1 capsule (100 mg total) by mouth 2 (two) times a day for 7 days          Subjective:      Patient ID: Tristan Espino is a 43 y.o. male.    He's been sick for 2 weeks.  He was diagnosed with strep throat and RSV.  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  He has not had recent fevers.  He did fairly well on a brief steroid taper, but he feels like he is back sliding.  He has productive cough and wheeze.  He is using an inhaler, but struggles with the technique.  He would benefit from a nebulizer.        The following portions of the patient's history were reviewed and updated as appropriate: He  has a past medical history of Anxiety and Depression.  He   Patient Active Problem List    Diagnosis Date Noted    Upper respiratory tract infection 12/29/2023    Arthritis of left knee 07/05/2023    Paul's esophagus without dysplasia 03/22/2023    Recurrent depression (HCC) 03/22/2023    AIYANA (generalized anxiety disorder) 03/22/2023    Chronic bilateral low back pain without sciatica 03/22/2023    Gastritis 03/22/2023    Accidental ingestion of substance 03/22/2023     He  has a past surgical history that includes Appendectomy (2019); Hernia repair; and Shoulder surgery.  His family history includes Bone cancer in his father; Breast cancer in his maternal aunt and mother; Diabetes in his mother; Heart attack in his paternal  grandmother.  He  reports that he has never smoked. He has never used smokeless tobacco. He reports that he does not currently use alcohol. He reports that he does not use drugs.  Current Outpatient Medications   Medication Sig Dispense Refill    albuterol (Ventolin HFA) 90 mcg/act inhaler Inhale 2 puffs every 6 (six) hours as needed for wheezing 8 g 0    atoMOXetine (STRATTERA) 40 mg capsule Take 1 capsule (40 mg total) by mouth every morning 90 capsule 1    doxycycline monohydrate (MONODOX) 100 mg capsule Take 1 capsule (100 mg total) by mouth 2 (two) times a day for 7 days 14 capsule 0    ergocalciferol (VITAMIN D2) 50,000 units take 1 capsule by mouth TWO TIMES PER WEEK for 24 doses 24 capsule 0    escitalopram (LEXAPRO) 20 mg tablet Take 20 mg by mouth daily      ipratropium-albuterol (DUO-NEB) 0.5-2.5 mg/3 mL nebulizer solution Take 3 mL by nebulization 4 (four) times a day 180 mL 3    predniSONE 10 mg tablet Take 4 tablets (40 mg total) by mouth daily for 3 days, THEN 3 tablets (30 mg total) daily for 3 days, THEN 2 tablets (20 mg total) daily for 3 days, THEN 1 tablet (10 mg total) daily for 3 days. 30 tablet 0     No current facility-administered medications for this visit.     Current Outpatient Medications on File Prior to Visit   Medication Sig    albuterol (Ventolin HFA) 90 mcg/act inhaler Inhale 2 puffs every 6 (six) hours as needed for wheezing    atoMOXetine (STRATTERA) 40 mg capsule Take 1 capsule (40 mg total) by mouth every morning    ergocalciferol (VITAMIN D2) 50,000 units take 1 capsule by mouth TWO TIMES PER WEEK for 24 doses    escitalopram (LEXAPRO) 20 mg tablet Take 20 mg by mouth daily     No current facility-administered medications on file prior to visit.     He has No Known Allergies..    Review of Systems   Respiratory:  Positive for cough, chest tightness, shortness of breath and wheezing.    All other systems reviewed and are negative.        Objective:      /90 (BP Location:  "Right arm, Patient Position: Sitting)   Pulse 84   Temp 98 °F (36.7 °C) (Tympanic)   Ht 5' 9\" (1.753 m)   Wt (!) 139 kg (306 lb)   SpO2 96%   BMI 45.19 kg/m²          Physical Exam  Vitals and nursing note reviewed.   Constitutional:       Appearance: Normal appearance. He is obese.   Cardiovascular:      Rate and Rhythm: Normal rate and regular rhythm.      Pulses: Normal pulses.      Heart sounds: Normal heart sounds.   Pulmonary:      Effort: Pulmonary effort is normal.      Breath sounds: Wheezing present.   Abdominal:      General: Abdomen is flat. Bowel sounds are normal.      Palpations: Abdomen is soft.   Musculoskeletal:         General: Normal range of motion.      Cervical back: Normal range of motion and neck supple.   Skin:     General: Skin is warm and dry.      Capillary Refill: Capillary refill takes less than 2 seconds.   Neurological:      General: No focal deficit present.      Mental Status: He is alert and oriented to person, place, and time. Mental status is at baseline.   Psychiatric:         Mood and Affect: Mood normal.         Behavior: Behavior normal.         Thought Content: Thought content normal.         Judgment: Judgment normal.           "

## 2024-01-08 NOTE — LETTER
January 8, 2024     Patient: Tristan Espino  YOB: 1980  Date of Visit: 1/8/2024      To Whom it May Concern:    Tristan Espino is under my professional care. Tristan was seen in my office on 1/8/2024. Tristan may return to work on 1/10/24 .    Please excuse Mr. Espino from work 1/8-1/9.      If you have any questions or concerns, please don't hesitate to call.         Sincerely,          Glenn King MD        CC: No Recipients

## 2024-01-09 ENCOUNTER — TELEPHONE (OUTPATIENT)
Age: 44
End: 2024-01-09

## 2024-01-09 LAB
DME PARACHUTE DELIVERY DATE REQUESTED: NORMAL
DME PARACHUTE ITEM DESCRIPTION: NORMAL
DME PARACHUTE ORDER STATUS: NORMAL
DME PARACHUTE SUPPLIER NAME: NORMAL
DME PARACHUTE SUPPLIER PHONE: NORMAL

## 2024-01-09 NOTE — TELEPHONE ENCOUNTER
Patient asking for a doctors note for both jobs extended through the week due to still not feeling well and waiting for his nebulizer. He will pick them up.

## 2024-01-09 NOTE — TELEPHONE ENCOUNTER
Taken care of and pt aware      He also stated that he is going to stay home tomorrow. He is wondering if you feel it is best to actually stay home the rest of the week and return Monday. He hasn't gotten the nebulizer just yet. He is reaching out to see when they will be delivering. He will need another note for tomorrow or just a new note if you feel he should just wait to return on Monday

## 2024-01-09 NOTE — LETTER
January 9, 2024     Patient: Tristan Espino  YOB: 1980  Date of Visit: 1/9/2024      To Whom it May Concern:    Tristan Espino is under my professional care. Tristan was seen in my office on 1/8/2024. Tristan may return to work on 1/15/2024 .    If you have any questions or concerns, please don't hesitate to call.         Sincerely,          Trudy Ronquillo        CC: No Recipients

## 2024-01-09 NOTE — TELEPHONE ENCOUNTER
Patient called in to check if Dr. Glenn King as sent in the script for Nebulizer. Please confirm the same to patient by return call. Thanks.

## 2024-01-09 NOTE — LETTER
January 9, 2024     Patient: Tristan Espino  YOB: 1980  Date of Visit: 1/9/2024      To Whom it May Concern:    Tristan Espino is under my professional care. Tristan was seen in my office on 1/8/2024. Tristan may return to work on 1/15/2024 .    If you have any questions or concerns, please don't hesitate to call.         Sincerely,          Glenn King MD        CC: No Recipients

## 2024-01-10 ENCOUNTER — RA CDI HCC (OUTPATIENT)
Dept: OTHER | Facility: HOSPITAL | Age: 44
End: 2024-01-10

## 2024-01-10 NOTE — PROGRESS NOTES
HCC coding opportunities          Chart Reviewed number of suggestions sent to Provider: 1   E66.01    This is a reminder to address (resolve/update/assess) ALL HCC (risk adjustment) codes as found on active problem list for 2024 as patient scores reset to zero MARCELLA.  Also, just a reminder to please review and assess all other chronic conditions for 2024  Patients Insurance        Commercial Insurance: [employer]

## 2024-01-12 DIAGNOSIS — J06.9 UPPER RESPIRATORY TRACT INFECTION, UNSPECIFIED TYPE: ICD-10-CM

## 2024-01-12 RX ORDER — DOXYCYCLINE 100 MG/1
100 CAPSULE ORAL 2 TIMES DAILY
Qty: 14 CAPSULE | Refills: 0 | Status: SHIPPED | OUTPATIENT
Start: 2024-01-12 | End: 2024-01-19

## 2024-01-12 NOTE — TELEPHONE ENCOUNTER
Pt requests an new prescription as his prescription may have gotten thrown away.  Pharmacy directed him to ask us for new prescription.Please send [facility]

## 2024-01-20 DIAGNOSIS — F98.8 ATTENTION DEFICIT DISORDER (ADD) WITHOUT HYPERACTIVITY: ICD-10-CM

## 2024-01-23 RX ORDER — ATOMOXETINE 40 MG/1
40 CAPSULE ORAL EVERY MORNING
Qty: 90 CAPSULE | Refills: 1 | Status: SHIPPED | OUTPATIENT
Start: 2024-01-23 | End: 2024-01-25 | Stop reason: SDUPTHER

## 2024-01-25 ENCOUNTER — OFFICE VISIT (OUTPATIENT)
Dept: FAMILY MEDICINE CLINIC | Facility: CLINIC | Age: 44
End: 2024-01-25
Payer: COMMERCIAL

## 2024-01-25 VITALS
WEIGHT: 299 LBS | TEMPERATURE: 97.1 F | DIASTOLIC BLOOD PRESSURE: 92 MMHG | BODY MASS INDEX: 44.15 KG/M2 | SYSTOLIC BLOOD PRESSURE: 134 MMHG | HEART RATE: 81 BPM | OXYGEN SATURATION: 98 %

## 2024-01-25 DIAGNOSIS — Z13.0 SCREENING FOR DEFICIENCY ANEMIA: ICD-10-CM

## 2024-01-25 DIAGNOSIS — Z13.220 SCREENING, LIPID: ICD-10-CM

## 2024-01-25 DIAGNOSIS — F98.8 ATTENTION DEFICIT DISORDER (ADD) WITHOUT HYPERACTIVITY: Primary | ICD-10-CM

## 2024-01-25 DIAGNOSIS — F33.9 RECURRENT DEPRESSION (HCC): ICD-10-CM

## 2024-01-25 DIAGNOSIS — Z11.1 SCREENING FOR TUBERCULOSIS: ICD-10-CM

## 2024-01-25 DIAGNOSIS — Z13.6 SCREENING FOR CARDIOVASCULAR CONDITION: ICD-10-CM

## 2024-01-25 DIAGNOSIS — F41.1 GAD (GENERALIZED ANXIETY DISORDER): ICD-10-CM

## 2024-01-25 DIAGNOSIS — M17.12 ARTHRITIS OF LEFT KNEE: ICD-10-CM

## 2024-01-25 DIAGNOSIS — E55.9 VITAMIN D DEFICIENCY: ICD-10-CM

## 2024-01-25 PROCEDURE — 86580 TB INTRADERMAL TEST: CPT

## 2024-01-25 PROCEDURE — 99213 OFFICE O/P EST LOW 20 MIN: CPT

## 2024-01-25 RX ORDER — ATOMOXETINE 40 MG/1
40 CAPSULE ORAL EVERY MORNING
Qty: 90 CAPSULE | Refills: 1 | Status: SHIPPED | OUTPATIENT
Start: 2024-01-25

## 2024-01-25 RX ORDER — ERGOCALCIFEROL 1.25 MG/1
50000 CAPSULE ORAL 2 TIMES WEEKLY
Qty: 24 CAPSULE | Refills: 0 | Status: SHIPPED | OUTPATIENT
Start: 2024-01-25

## 2024-01-25 RX ORDER — ESCITALOPRAM OXALATE 20 MG/1
20 TABLET ORAL DAILY
Qty: 90 TABLET | Refills: 1 | Status: SHIPPED | OUTPATIENT
Start: 2024-01-25

## 2024-01-25 NOTE — PROGRESS NOTES
Name: Tristan Espino      : 1980      MRN: [id number]  Encounter Provider: Dmitriy Simpson PA-C  Encounter Date: 2024   Encounter department: [facility]    Assessment & Plan     1. Attention deficit disorder (ADD) without hyperactivity  Assessment & Plan:  Stable.  Continue Strattera 40 mg daily.    Orders:  -     atoMOXetine (STRATTERA) 40 mg capsule; Take 1 capsule (40 mg total) by mouth every morning    2. Vitamin D deficiency  Assessment & Plan:  Will monitor with lab work.    Orders:  -     ergocalciferol (VITAMIN D2) 50,000 units; Take 1 capsule (50,000 Units total) by mouth 2 (two) times a week  -     Vitamin D 25 hydroxy; Future    3. AIYANA (generalized anxiety disorder)  Assessment & Plan:  Stable.  Continue Lexapro 20 mg daily.    Orders:  -     escitalopram (LEXAPRO) 20 mg tablet; Take 1 tablet (20 mg total) by mouth daily    4. Recurrent depression (HCC)  Assessment & Plan:  Stable.  Continue Lexapro 20 mg daily.    Orders:  -     escitalopram (LEXAPRO) 20 mg tablet; Take 1 tablet (20 mg total) by mouth daily    5. Arthritis of left knee  Assessment & Plan:  Improving.  Follow-up with worsening symptoms.      6. Screening, lipid    7. Screening for deficiency anemia  -     CBC and differential; Future    8. Screening for cardiovascular condition  -     Comprehensive metabolic panel; Future  -     Hemoglobin A1C; Future    9. 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  Patient has no concerns today.      Review of Systems   Constitutional:  Negative for chills and fever.   HENT:  Negative for ear pain and sore throat.    Eyes:  Negative for pain and visual disturbance.   Respiratory:  Negative for cough and shortness of breath.    Cardiovascular:  Negative for chest pain and palpitations.   Gastrointestinal:  Negative for abdominal pain and vomiting.    Genitourinary:  Negative for dysuria and hematuria.   Musculoskeletal:  Negative for arthralgias and back pain.   Skin:  Negative for color change and rash.   Neurological:  Negative for seizures and syncope.   All other systems reviewed and are negative.      Current Outpatient Medications on File Prior to Visit   Medication Sig    albuterol (Ventolin HFA) 90 mcg/act inhaler Inhale 2 puffs every 6 (six) hours as needed for wheezing    ipratropium-albuterol (DUO-NEB) 0.5-2.5 mg/3 mL nebulizer solution Take 3 mL by nebulization 4 (four) times a day    [DISCONTINUED] atoMOXetine (STRATTERA) 40 mg capsule take 1 capsule by mouth IN THE MORNING    [DISCONTINUED] ergocalciferol (VITAMIN D2) 50,000 units take 1 capsule by mouth TWO TIMES PER WEEK for 24 doses    [DISCONTINUED] escitalopram (LEXAPRO) 20 mg tablet Take 20 mg by mouth daily       Objective     /92 (BP Location: Left arm, Patient Position: Sitting)   Pulse 81   Temp (!) 97.1 °F (36.2 °C) (Tympanic)   Wt 136 kg (299 lb)   SpO2 98%   BMI 44.15 kg/m²     Physical Exam  Vitals and nursing note reviewed.   Constitutional:       General: He is not in acute distress.     Appearance: Normal appearance. He is obese. He is not ill-appearing, toxic-appearing or diaphoretic.   HENT:      Head: Normocephalic and atraumatic.      Right Ear: Tympanic membrane normal.      Left Ear: Tympanic membrane normal.      Nose: Nose normal.      Mouth/Throat:      Mouth: Mucous membranes are moist.      Pharynx: Oropharynx is clear.   Eyes:      Extraocular Movements: Extraocular movements intact.      Conjunctiva/sclera: Conjunctivae normal.      Pupils: Pupils are equal, round, and reactive to light.   Cardiovascular:      Rate and Rhythm: Normal rate and regular rhythm.      Pulses: Normal pulses.      Heart sounds: Normal heart sounds. No murmur heard.  Pulmonary:      Effort: Pulmonary effort is normal. No respiratory distress.      Breath sounds: Normal breath  sounds. No wheezing.   Chest:      Chest wall: No tenderness.   Abdominal:      General: Bowel sounds are normal.      Palpations: Abdomen is soft. There is no mass.      Tenderness: There is no abdominal tenderness.   Musculoskeletal:         General: No tenderness. Normal range of motion.      Cervical back: Normal range of motion and neck supple. No tenderness.      Right lower leg: No edema.      Left lower leg: No edema.   Lymphadenopathy:      Cervical: No cervical adenopathy.   Skin:     General: Skin is warm.      Capillary Refill: Capillary refill takes less than 2 seconds.      Findings: No erythema or rash.   Neurological:      General: No focal deficit present.      Mental Status: He is alert and oriented to person, place, and time. Mental status is at baseline.      Motor: No weakness.   Psychiatric:         Mood and Affect: Mood normal.         Behavior: Behavior normal.         Thought Content: Thought content normal.         Judgment: Judgment normal.       Dmitriy Simpson PA-C

## 2024-01-26 ENCOUNTER — TELEPHONE (OUTPATIENT)
Age: 44
End: 2024-01-26

## 2024-01-26 NOTE — TELEPHONE ENCOUNTER
Spoke w/ Judith at [facility] and advised that pt has form with him.     Spoke w/ pt and advised that the 2nd page of his form he brought yesterday is what he needs to take with him for them to fill out as the 2nd page at the bottom has the TB info.

## 2024-01-26 NOTE — TELEPHONE ENCOUNTER
Patient called inquiring about the TB Skin Test paperwork. He called to make an appointment to go to [facility] in [place], [place] to have the test read tomorrow 1-. anytime after 5 pm. The facility told him that they need his TB paperwork and they haven't received anything. He stated he only has the [facility], [employer] school personnel health record paperwork. The [facility] location  requested the paperwork can be faxed to [phone], Attn: Judith. Please call patient to advise.

## 2024-01-26 NOTE — TELEPHONE ENCOUNTER
Pt called the office to schedule an appointment for a sore throat. Their are currently no available appointments today. Attempted to warm transfer to office to place pt on cancel list. Call was disconnected. Please call pt back on wife phone if any cancellations today. Ph [phone]

## 2024-01-27 ENCOUNTER — OFFICE VISIT (OUTPATIENT)
Dept: URGENT CARE | Facility: MEDICAL CENTER | Age: 44
End: 2024-01-27
Payer: COMMERCIAL

## 2024-01-27 VITALS
HEIGHT: 69 IN | OXYGEN SATURATION: 98 % | RESPIRATION RATE: 18 BRPM | TEMPERATURE: 98.2 F | BODY MASS INDEX: 45.03 KG/M2 | SYSTOLIC BLOOD PRESSURE: 136 MMHG | DIASTOLIC BLOOD PRESSURE: 80 MMHG | WEIGHT: 304 LBS | HEART RATE: 95 BPM

## 2024-01-27 DIAGNOSIS — J02.9 ACUTE PHARYNGITIS, UNSPECIFIED ETIOLOGY: Primary | ICD-10-CM

## 2024-01-27 LAB — S PYO AG THROAT QL: NEGATIVE

## 2024-01-27 PROCEDURE — 87880 STREP A ASSAY W/OPTIC: CPT | Performed by: STUDENT IN AN ORGANIZED HEALTH CARE EDUCATION/TRAINING PROGRAM

## 2024-01-27 PROCEDURE — 87147 CULTURE TYPE IMMUNOLOGIC: CPT | Performed by: STUDENT IN AN ORGANIZED HEALTH CARE EDUCATION/TRAINING PROGRAM

## 2024-01-27 PROCEDURE — 87070 CULTURE OTHR SPECIMN AEROBIC: CPT | Performed by: STUDENT IN AN ORGANIZED HEALTH CARE EDUCATION/TRAINING PROGRAM

## 2024-01-27 PROCEDURE — 99213 OFFICE O/P EST LOW 20 MIN: CPT | Performed by: STUDENT IN AN ORGANIZED HEALTH CARE EDUCATION/TRAINING PROGRAM

## 2024-01-27 RX ORDER — CEFUROXIME AXETIL 250 MG/1
250 TABLET ORAL EVERY 12 HOURS SCHEDULED
Qty: 20 TABLET | Refills: 0 | Status: SHIPPED | OUTPATIENT
Start: 2024-01-27 | End: 2024-02-06

## 2024-01-27 NOTE — PATIENT INSTRUCTIONS
- anticipate improvement 2-3 days after starting antibiotics. Complete antibiotic course even if you start feeling better  - return to clinic if stiff neck, drooling, headache, new fever, unable to swallow  - use lozenges, ice, soft foods, or popsicles  to soothe your throat.  - if unable to eat solid food, keep drinking fluids to avoid dehydration   - Gargle with salt water.  Mix ¼ teaspoon salt in a glass of warm water and gargle. This may help reduce swelling in your throat.  - Boil toothbrush each night and replace after 2-3 days of treatment  - Prevent the spread of strep throat by washing your hands and do not share food/drinks  - change toothpaste after replacing toothbrush

## 2024-01-27 NOTE — PROGRESS NOTES
"  [facility]        NAME: Tristan Espino is a 43 y.o. male  : 1980    MRN: [id number]    Assessment and Plan   Acute pharyngitis, unspecified etiology [J02.9]  1. Acute pharyngitis, unspecified etiology  POCT rapid strepA    cefuroxime (CEFTIN) 250 mg tablet    Throat culture          Results for orders placed or performed in visit on 24   POCT rapid strepA   Result Value Ref Range     RAPID STREP A Negative Negative     Due to close exposure to strep, presence of cervical adenopathy, suspicion for strep pharyngitis.  Rapid strep is negative.  Will send for culture.  Given close exposure at home, I will start empiric treatment with Ceftin.  I counseled patient extensively regarding importance of changing out toothbrushes per instructions to prevent reinfection as this may be the reason of why all family members keeping reinfected.  Discussed symptomatic and supportive measures.      Patient Instructions     See wrap up for details  Follow up with PCP in 3-5 days.  Proceed to  ER if symptoms worsen.    Chief Complaint     Chief Complaint   Patient presents with    Sore Throat     Sore throat started x 5 days , exposed to strep          History of Present Illness   /80   Pulse 95   Temp 98.2 °F (36.8 °C)   Resp 18   Ht 5' 9\" (1.753 m)   Wt (!) 138 kg (304 lb)   SpO2 98%   BMI 44.89 kg/m²     HPI    Presents with sore throat for 5 days.  Prior to ken, patient, his wife and his dtr had strep, bronchitis and RSV, was tx'ed with amoxicillin.   Symptoms never fully resolved as he had recurrence right after new years when he was tx'ed with doxycyline but this was for a URI. Symptoms still never resolved completely and now had recurrence again 5 days ago.   Reports sore throat, productive cough, postnasal drip, nausea  Denies fever, chills, vomiting, ear pain   Using albuterol nebulizer PRN for cough and congestion  Works as teacher   Wife, daughter and son are positive for strep " again, confirmed by culture and are currently being treated with cephalosporin.  Reports toothbrushes at home were never replaced after prior episode of strep.    Review of Systems   Review of Systems   Constitutional:  Negative for chills and fever.   HENT:  Positive for postnasal drip and sore throat. Negative for ear pain.    Eyes:  Negative for pain and visual disturbance.   Respiratory:  Positive for cough. Negative for shortness of breath.    Cardiovascular:  Negative for chest pain and palpitations.   Gastrointestinal:  Positive for nausea. Negative for abdominal pain and vomiting.   Genitourinary:  Negative for dysuria and hematuria.   Musculoskeletal:  Negative for arthralgias and back pain.   Skin:  Negative for color change and rash.   Neurological:  Negative for seizures and syncope.   All other systems reviewed and are negative.      Current Medications       Current Outpatient Medications:     atoMOXetine (STRATTERA) 40 mg capsule, Take 1 capsule (40 mg total) by mouth every morning, Disp: 90 capsule, Rfl: 1    cefuroxime (CEFTIN) 250 mg tablet, Take 1 tablet (250 mg total) by mouth every 12 (twelve) hours for 10 days, Disp: 20 tablet, Rfl: 0    ergocalciferol (VITAMIN D2) 50,000 units, Take 1 capsule (50,000 Units total) by mouth 2 (two) times a week, Disp: 24 capsule, Rfl: 0    escitalopram (LEXAPRO) 20 mg tablet, Take 1 tablet (20 mg total) by mouth daily, Disp: 90 tablet, Rfl: 1    albuterol (Ventolin HFA) 90 mcg/act inhaler, Inhale 2 puffs every 6 (six) hours as needed for wheezing (Patient not taking: Reported on 1/27/2024), Disp: 8 g, Rfl: 0    ipratropium-albuterol (DUO-NEB) 0.5-2.5 mg/3 mL nebulizer solution, Take 3 mL by nebulization 4 (four) times a day (Patient not taking: Reported on 1/27/2024), Disp: 180 mL, Rfl: 3    Current Allergies     Allergies as of 01/27/2024    (No Known Allergies)            The following portions of the patient's history were reviewed and updated as appropriate:  "allergies, current medications, past family history, past medical history, past social history, past surgical history and problem list.     Past Medical History:   Diagnosis Date    Anxiety     Depression        Past Surgical History:   Procedure Laterality Date    APPENDECTOMY  2019    HERNIA REPAIR      SHOULDER SURGERY         Family History   Problem Relation Age of Onset    Breast cancer Mother     Diabetes Mother     Bone cancer Father     Heart attack Paternal Grandmother     Breast cancer Maternal Aunt          Medications have been verified.        Objective   /80   Pulse 95   Temp 98.2 °F (36.8 °C)   Resp 18   Ht 5' 9\" (1.753 m)   Wt (!) 138 kg (304 lb)   SpO2 98%   BMI 44.89 kg/m²        Physical Exam     Physical Exam  Constitutional:       Appearance: Normal appearance.   HENT:      Head: Normocephalic and atraumatic.      Right Ear: Tympanic membrane and ear canal normal.      Left Ear: Tympanic membrane and ear canal normal.      Nose: Congestion present.      Mouth/Throat:      Mouth: Mucous membranes are moist.      Pharynx: Oropharynx is clear. Posterior oropharyngeal erythema present. No oropharyngeal exudate.      Tonsils: No tonsillar exudate or tonsillar abscesses. 2+ on the right. 2+ on the left.   Eyes:      General: No scleral icterus.     Conjunctiva/sclera: Conjunctivae normal.   Cardiovascular:      Rate and Rhythm: Normal rate.   Pulmonary:      Effort: Pulmonary effort is normal. No respiratory distress.   Musculoskeletal:      Cervical back: Normal range of motion.   Lymphadenopathy:      Cervical: Cervical adenopathy present.   Neurological:      General: No focal deficit present.      Mental Status: He is alert and oriented to person, place, and time.   Psychiatric:         Mood and Affect: Mood normal.         Behavior: Behavior normal.           "

## 2024-01-28 LAB — BACTERIA THROAT CULT: NORMAL

## 2024-01-29 LAB — BACTERIA THROAT CULT: ABNORMAL

## 2024-03-22 ENCOUNTER — OFFICE VISIT (OUTPATIENT)
Dept: FAMILY MEDICINE CLINIC | Facility: CLINIC | Age: 44
End: 2024-03-22
Payer: COMMERCIAL

## 2024-03-22 VITALS
HEIGHT: 69 IN | HEART RATE: 84 BPM | TEMPERATURE: 97.2 F | DIASTOLIC BLOOD PRESSURE: 100 MMHG | SYSTOLIC BLOOD PRESSURE: 118 MMHG | WEIGHT: 306.4 LBS | OXYGEN SATURATION: 97 % | BODY MASS INDEX: 45.38 KG/M2

## 2024-03-22 DIAGNOSIS — T78.40XS ALLERGY, SEQUELA: Primary | ICD-10-CM

## 2024-03-22 DIAGNOSIS — R69 SICK: ICD-10-CM

## 2024-03-22 PROCEDURE — 99213 OFFICE O/P EST LOW 20 MIN: CPT | Performed by: FAMILY MEDICINE

## 2024-03-22 RX ORDER — MONTELUKAST SODIUM 10 MG/1
10 TABLET ORAL
Qty: 90 TABLET | Refills: 3 | Status: SHIPPED | OUTPATIENT
Start: 2024-03-22

## 2024-03-22 RX ORDER — DOXYCYCLINE 100 MG/1
100 CAPSULE ORAL 2 TIMES DAILY
Qty: 14 CAPSULE | Refills: 0 | Status: SHIPPED | OUTPATIENT
Start: 2024-03-22 | End: 2024-03-29

## 2024-03-22 RX ORDER — FLUTICASONE PROPIONATE 50 MCG
1 SPRAY, SUSPENSION (ML) NASAL DAILY
Qty: 9.9 ML | Refills: 5 | Status: SHIPPED | OUTPATIENT
Start: 2024-03-22

## 2024-03-22 RX ORDER — METHYLPREDNISOLONE 4 MG/1
TABLET ORAL
Qty: 21 EACH | Refills: 0 | Status: SHIPPED | OUTPATIENT
Start: 2024-03-22

## 2024-03-22 NOTE — PROGRESS NOTES
"Assessment/Plan:    No problem-specific Assessment & Plan notes found for this encounter.       Diagnoses and all orders for this visit:    Allergy, sequela  -     montelukast (SINGULAIR) 10 mg tablet; Take 1 tablet (10 mg total) by mouth daily at bedtime  -     fluticasone (FLONASE) 50 mcg/act nasal spray; 1 spray into each nostril daily    Sick  -     methylPREDNISolone 4 MG tablet therapy pack; Use as directed on package  -     doxycycline monohydrate (MONODOX) 100 mg capsule; Take 1 capsule (100 mg total) by mouth 2 (two) times a day for 7 days          Subjective:      Patient ID: Tristan Espino is a 43 y.o. male.    He has runny nose and cough for two days.  He has seasonal allergies.  He takes OTC meds for allergies.  He has no F/C.  He has multiple ill contacts at work (school).            Review of Systems   HENT:  Positive for congestion, ear pain, postnasal drip and rhinorrhea.    All other systems reviewed and are negative.        Objective:      /100 (BP Location: Left arm, Patient Position: Sitting)   Pulse 84   Temp (!) 97.2 °F (36.2 °C) (Tympanic)   Ht 5' 9\" (1.753 m)   Wt (!) 139 kg (306 lb 6.4 oz)   SpO2 97%   BMI 45.25 kg/m²          Physical Exam  Vitals and nursing note reviewed.   Constitutional:       Appearance: Normal appearance. He is obese.   Cardiovascular:      Rate and Rhythm: Normal rate and regular rhythm.      Pulses: Normal pulses.      Heart sounds: Normal heart sounds.   Pulmonary:      Effort: Pulmonary effort is normal.      Breath sounds: Normal breath sounds.   Abdominal:      General: Abdomen is flat. Bowel sounds are normal.      Palpations: Abdomen is soft.   Musculoskeletal:         General: Normal range of motion.      Cervical back: Normal range of motion and neck supple.   Skin:     General: Skin is warm and dry.      Capillary Refill: Capillary refill takes less than 2 seconds.   Neurological:      General: No focal deficit present.      Mental Status: He " is alert and oriented to person, place, and time. Mental status is at baseline.   Psychiatric:         Mood and Affect: Mood normal.         Behavior: Behavior normal.         Thought Content: Thought content normal.         Judgment: Judgment normal.

## 2024-03-22 NOTE — LETTER
March 22, 2024     Patient: Tristan Espino  YOB: 1980  Date of Visit: 3/22/2024      To Whom it May Concern:    Tristan Espino is under my professional care. Tristan was seen in my office on 3/22/2024. Tristan may return to work on 3/25/24 .    If you have any questions or concerns, please don't hesitate to call.         Sincerely,          Glenn King MD        CC: No Recipients

## 2024-06-28 ENCOUNTER — TRANSITIONAL CARE MANAGEMENT (OUTPATIENT)
Dept: FAMILY MEDICINE CLINIC | Facility: CLINIC | Age: 44
End: 2024-06-28

## 2024-06-28 ENCOUNTER — TELEPHONE (OUTPATIENT)
Age: 44
End: 2024-06-28

## 2024-06-28 NOTE — TELEPHONE ENCOUNTER
Received call from CARLOS Dejesus with [facility] enquiring if patient has a f/u appointment scheduled after recent DC from the hospital on 6/26. Advised that patient does have an appointment scheduled for 7/8/24.    If there are any care coordination needs,   Anjelica can be contacted at:  Ph: [phone]    She will also be faxing over a request for DCI and AVS.

## 2024-07-01 NOTE — TELEPHONE ENCOUNTER
Spoke with pt who said he is currently at the hospital with his mother and he will call back. Pt needs to be transferred into office to schedule TCM

## 2024-07-03 RX ORDER — CEPHALEXIN 500 MG/1
500 CAPSULE ORAL 4 TIMES DAILY
COMMUNITY
Start: 2024-06-26 | End: 2024-07-10

## 2024-07-03 RX ORDER — ACETAMINOPHEN 325 MG/1
650 TABLET ORAL EVERY 6 HOURS PRN
COMMUNITY
Start: 2024-06-26 | End: 2024-07-12

## 2024-07-03 RX ORDER — SULFAMETHOXAZOLE AND TRIMETHOPRIM 800; 160 MG/1; MG/1
1 TABLET ORAL 2 TIMES DAILY
COMMUNITY
Start: 2024-06-26 | End: 2024-07-10

## 2024-07-05 ENCOUNTER — TELEMEDICINE (OUTPATIENT)
Dept: FAMILY MEDICINE CLINIC | Facility: CLINIC | Age: 44
End: 2024-07-05
Payer: COMMERCIAL

## 2024-07-05 VITALS — BODY MASS INDEX: 45.25 KG/M2 | HEIGHT: 69 IN

## 2024-07-05 DIAGNOSIS — L03.818 CELLULITIS OF OTHER SPECIFIED SITE: Primary | ICD-10-CM

## 2024-07-05 DIAGNOSIS — A49.02 MRSA (METHICILLIN RESISTANT STAPHYLOCOCCUS AUREUS): ICD-10-CM

## 2024-07-05 PROCEDURE — 99495 TRANSJ CARE MGMT MOD F2F 14D: CPT

## 2024-07-05 NOTE — PROGRESS NOTES
Virtual TCM Visit:    Verification of patient location:    Patient is located at Home in the following state in which I hold an active license [place]    Assessment & Plan   1. Cellulitis of other specified site  2. MRSA (methicillin resistant Staphylococcus aureus)  Will have patient continue antibiotics for full course.  Patient is educated to cease use of triamcinolone cream on the area due to concurrent infection, and dangers to skin when used on genitalia.  Patient verbalizes understanding.  Patient is educated on return to the ER if any new or worsening symptoms present.  If symptoms persist despite full course of antibiotics, I will see patient in office if clinically stable with no new or worsening symptoms.  If unstable or new/worsening symptoms, patient is to go directly to the ER.  Patient is to contact office if any issues.  Urology did evaluate patient bedside in hospital, and he is to be referred there if any persisting issues.  No abscesses seen on CT in hospital.       Encounter provider Dmitriy Simpson PA-C     Provider located at [address] [address]    Recent Visits  No visits were found meeting these conditions.  Showing recent visits within past 7 days and meeting all other requirements  Today's Visits  Date Type Provider Dept   07/05/24 Telemedicine Dmitriy Simpson PA-C [facility]   Showing today's visits and meeting all other requirements  Future Appointments  No visits were found meeting these conditions.  Showing future appointments within next 150 days and meeting all other requirements       After connecting through [employer], the patient was identified by name and date of birth. Tristan Espino was informed that this is a telemedicine visit and that the visit is being conducted through the demandmart platform. He agrees to proceed..  My office door was closed. No one else was in the room.  He acknowledged consent and understanding  of privacy and security of the video platform. The patient has agreed to participate and understands they can discontinue the visit at any time.    Patient is aware this is a billable service.    History of Present Illness     Transitional Care Management Review:   Tristan Espino is a 43 y.o. male here for TCM follow up.    During the TCM phone call patient stated:  TCM Call       Date and time call was made  7/3/2024  3:07 PM    Hospital care reviewed  Records reviewed    Patient was hospitialized at  Other (comment)    Comment  Arely Beaulieu    Date of Admission  06/21/24    Date of discharge  06/26/24    Diagnosis  Scrotal infection    Disposition  Home    Current Symptoms  Pain with urination    Pain with urination severity  Mild    Pain with urination  Ongoing          TCM Call       Post hospital issues  None    Should patient be enrolled in anticoag monitoring?  No    Scheduled for follow up?  Yes    Did you obtain your prescribed medications  Yes    Do you need help managing your prescriptions or medications  No    Is transportation to your appointment needed  No    Living Arrangements  Spouse or Significiant other    Support System  Spouse    The type of support provided  Emotional; Physical    Do you have social support  Yes, as much as I need    Are you recieving any outpatient services  No    Are you recieving home care services  No    Are you using any community resources  No    Current waiver services  No    Have you fallen in the last 12 months  No    Interperter language line needed  No    Counseling  Patient          Patient is a 44yo male presenting for TCM of cellulitis and MRSA of the genital region.  Patient is still experiencing mild itching and burning in the genital region, but it has significantly improved since leaving the hospital.  He is currently still taking Keflex qid and Bactrim bid for a few more days.  Denies fevers, pain, chills, SOB, DONALDSON, syncope, dizziness, heart  palpitations, discharge, wounds.  Does still have mild erythema.  Had symptoms in the genital region for 2 weeks prior to ER visit, and developed abdominal infection on day of ER visit.  Patient has had this occur in the past.  Patient has been using gold bond powder and triamcinolone 0.1% cream on the area for itching.    Hospital Course:  He came to the emergency room due to pain in his groin and perineal area. Per patient, he started having itching and sweats in his groins around 2 weeks ago. He used a over the counter cream which helped with the itching but in a few days he started developing pain in his groin. A few days ago he started noticing some bumps in his scrotum. He could feel those bumps while walking and it was associated with pain. Yesterday he had fever and the pain worsened. In the evening yesterday he also noticed a red colored skin area in his left lower abdominal wall which is hurting. He has a [place] He was also positive for MRSA PCR last year in April. His brother also had a similar bump in his neck for which he came to the emergency room and it was believed to be MRSA skin infection. He does not have any history of trauma/cuts to the skin.   On arrival to the emergency room, he was afebrile, had blood pressure of 149/99 and pulse of 90. Eventually he spiked a fever of 100.7°. He had mild leukocytosis of 11.6. CT abdomen/pelvis with IV contrast was done which did not show any abscess. Ultrasound of the scrotum was done which showed area of increased vascularity in right inguinal scrotal region likely inflammatory in nature. He was started on vancomycin and Zosyn for skin infection. He was also given Toradol 15 mg IV x1 for pain control.  Most Recent Vital Signs:  BP: 121 mmHg/80 mmHg (06/22/24 0100)   Pulse: 84 (06/22/24 0100)  Resp: 16 (06/22/24 0040)  Temp: 38.17 C (06/22/24 0100)  Temp Summary: Temp Min: 37.9 °C (100.2  °F) Max: 38.2 °C (100.7 °F)  SpO2: 100 % (06/22/24 0100)  O2 flow rate:   Supplemental O2 Delivery: Room Air, None (06/22/24 0040)  Constitutional: no acute distress  CV: normal heart sounds, normal rate, normal rhythm, no murmur  Chest: normal respiratory effort, breath sounds normal  Abdomen: normal: soft, bowel sounds normal, erythematous patch in left lower abdomen  Extremities: no clubbing, cyanosis, or edema  Neuro: alert, oriented to person, place, and time, normal mental status exam, muscular strength 5/5 and symmetric  Genitourinary - erythematous scrotum, nodule felt on right side of scrotum    HOSPITAL COURSE (focused):     Abdominal wall cellulitis   Scrotal cellulitis  Sepsis - resolved  Leukocytosis - resolved  - continue oral antibiotics for 14 days : Bactrim DS twice daily and Keflex 500 mg four times daily for 14 days.   - please follow up with you PCP for post discharge evaluation  - follow up with Urology clinic as outpatient if needed    Operations & Procedures: none  Complications: none significant  Significant Lab and Imaging Results: As mentioned above  Results Pending at Discharge:   Lab Results Pending at Discharge:     None           MEDICATION UPDATES AT DISCHARGE       START taking these medications     INSTRUCTIONS   Acetaminophen 325 MG Tablet  Commonly known as: Tylenol    Take 2 Tablets by mouth every 6 hours as needed for Pain, Mild.    Cephalexin 500 MG Capsule  Commonly known as: Keflex    Take 1 Capsule by mouth in the morning and 1 Capsule at noon and 1 Capsule in the evening and 1 Capsule before bedtime. Do all this for 14 days.    sulfamethoxazole-trimethoprim -160 MG per tablet  Commonly known as: Bactrim DS    Take 1 Tablet by mouth in the morning and 1 Tablet before bedtime. Do all this for 14 days.   Review of Systems   Constitutional:  Negative for appetite change, chills, diaphoresis, fatigue and fever.   HENT:  Negative for congestion, ear discharge, ear pain,  "postnasal drip, rhinorrhea, sinus pressure, sinus pain, sneezing and sore throat.    Eyes:  Negative for pain, discharge, redness, itching and visual disturbance.   Respiratory:  Negative for apnea, cough, chest tightness, shortness of breath and wheezing.    Cardiovascular:  Negative for chest pain, palpitations and leg swelling.   Gastrointestinal:  Negative for abdominal pain, blood in stool, constipation, diarrhea, nausea and vomiting.   Endocrine: Negative for cold intolerance, heat intolerance, polydipsia and polyuria.   Genitourinary:  Negative for dysuria, flank pain, frequency, hematuria and urgency.   Musculoskeletal:  Negative for arthralgias, back pain, myalgias, neck pain and neck stiffness.   Skin:  Negative for color change and rash.        Itching/burning of genitalia region   Allergic/Immunologic: Negative.    Neurological:  Negative for dizziness, tremors, seizures, syncope, facial asymmetry, speech difficulty, weakness, light-headedness, numbness and headaches.   Hematological:  Negative for adenopathy. Does not bruise/bleed easily.   Psychiatric/Behavioral:  Negative for agitation, confusion, decreased concentration, dysphoric mood, hallucinations, self-injury, sleep disturbance and suicidal ideas. The patient is not nervous/anxious and is not hyperactive.    All other systems reviewed and are negative.    Objective     Ht 5' 9\" (1.753 m)   BMI 45.25 kg/m²     Physical Exam  Constitutional:       General: He is not in acute distress.     Appearance: Normal appearance. He is not ill-appearing, toxic-appearing or diaphoretic.   Pulmonary:      Effort: Pulmonary effort is normal. No respiratory distress.   Neurological:      General: No focal deficit present.      Mental Status: He is alert and oriented to person, place, and time. Mental status is at baseline.   Psychiatric:         Mood and Affect: Mood normal.         Behavior: Behavior normal.         Thought Content: Thought content normal.    "      Judgment: Judgment normal.     Unable to complete full PE due to virtual appt  Medications have been reviewed by provider in current encounter    Administrative Statements         Dmitriy Simpson PA-C      VIRTUAL VISIT DISCLAIMER    Tristan Espino verbally agrees to participate in Virtual Care Services. Pt is aware that Virtual Care Services could be limited without vital signs or the ability to perform a full hands-on physical exam. Tristan Espino understands he or the provider may request at any time to terminate the video visit and request the patient to seek care or treatment in person.

## 2024-07-10 ENCOUNTER — TELEPHONE (OUTPATIENT)
Age: 44
End: 2024-07-10

## 2024-07-10 NOTE — TELEPHONE ENCOUNTER
Pt was seen on 7/5/24 for TCM with PCP (pt stated that he was hospitalized on 6/21 for MRSA and Cellulitis).    At TCM appt, pt stated that PCP advised him that if he wasn't feeling better by 7/10 to call the office and even if he was booked, he would get him in.     Pt stated that he is not feeling better and was insisting on an appointment on 7/11/24. Due to no provider availability on 7/11/24 - pt scheduled for SAME DAY with Dr. King.

## 2024-07-12 ENCOUNTER — OFFICE VISIT (OUTPATIENT)
Dept: FAMILY MEDICINE CLINIC | Facility: CLINIC | Age: 44
End: 2024-07-12
Payer: COMMERCIAL

## 2024-07-12 VITALS
HEIGHT: 69 IN | HEART RATE: 71 BPM | WEIGHT: 292.4 LBS | TEMPERATURE: 97.7 F | BODY MASS INDEX: 43.31 KG/M2 | DIASTOLIC BLOOD PRESSURE: 98 MMHG | OXYGEN SATURATION: 99 % | SYSTOLIC BLOOD PRESSURE: 118 MMHG

## 2024-07-12 DIAGNOSIS — L03.818 CELLULITIS OF OTHER SPECIFIED SITE: ICD-10-CM

## 2024-07-12 DIAGNOSIS — F41.1 GAD (GENERALIZED ANXIETY DISORDER): ICD-10-CM

## 2024-07-12 DIAGNOSIS — Z12.11 COLON CANCER SCREENING: ICD-10-CM

## 2024-07-12 DIAGNOSIS — G47.33 OSA (OBSTRUCTIVE SLEEP APNEA): ICD-10-CM

## 2024-07-12 DIAGNOSIS — F33.9 RECURRENT DEPRESSION (HCC): ICD-10-CM

## 2024-07-12 DIAGNOSIS — R21 RASH: Primary | ICD-10-CM

## 2024-07-12 PROCEDURE — 99214 OFFICE O/P EST MOD 30 MIN: CPT | Performed by: FAMILY MEDICINE

## 2024-07-12 RX ORDER — CEPHALEXIN 500 MG/1
500 CAPSULE ORAL 3 TIMES DAILY
Qty: 21 CAPSULE | Refills: 0 | Status: SHIPPED | OUTPATIENT
Start: 2024-07-12 | End: 2024-07-19

## 2024-07-12 RX ORDER — KETOCONAZOLE 20 MG/G
CREAM TOPICAL DAILY
Qty: 60 G | Refills: 5 | Status: SHIPPED | OUTPATIENT
Start: 2024-07-12

## 2024-07-12 RX ORDER — SULFAMETHOXAZOLE AND TRIMETHOPRIM 800; 160 MG/1; MG/1
1 TABLET ORAL EVERY 12 HOURS SCHEDULED
Qty: 14 TABLET | Refills: 0 | Status: SHIPPED | OUTPATIENT
Start: 2024-07-12 | End: 2024-07-19

## 2024-07-12 NOTE — PROGRESS NOTES
"Ambulatory Visit  Name: Tristan Espino      : 1980      MRN: [id number]  Encounter Provider: Glenn King MD  Encounter Date: 2024   Encounter department: [facility]    Assessment & Plan   1. Rash  -     ketoconazole (NIZORAL) 2 % cream; Apply topically daily  2. Cellulitis of other specified site  Assessment & Plan:  I suspect the tinea cruris is the major issue.  Will treat the cellulitis but will need to clear the tinea as well.  Orders:  -     cephalexin (KEFLEX) 500 mg capsule; Take 1 capsule (500 mg total) by mouth 3 (three) times a day for 7 days  -     sulfamethoxazole-trimethoprim (BACTRIM DS) 800-160 mg per tablet; Take 1 tablet by mouth every 12 (twelve) hours for 7 days  3. Colon cancer screening  -     Ambulatory Referral to Gastroenterology; Future  4. CHRISTINA (obstructive sleep apnea)  -     Ambulatory referral to Sleep Medicine; Future  5. Recurrent depression (HCC)  -     Ambulatory referral to Psych Services; Future  6. AIYANA (generalized anxiety disorder)  -     Ambulatory referral to Psych Services; Future       History of Present Illness     Physical activity:  walk 30 minutes 5 days a week, strength train twice a week.    Diet:  portion reduction.    Medications:  Lexapro may cause wait gain.    Behaviors:  no binging, emotional eating, or stress eating.  CHRISTINA may be a factor.        Review of Systems   All other systems reviewed and are negative.      Objective     /98 (BP Location: Left arm, Patient Position: Sitting)   Pulse 71   Temp 97.7 °F (36.5 °C) (Tympanic)   Ht 5' 9\" (1.753 m)   Wt 133 kg (292 lb 6.4 oz)   SpO2 99%   BMI 43.18 kg/m²     Physical Exam  Vitals and nursing note reviewed.   Constitutional:       Appearance: Normal appearance. He is obese.   Cardiovascular:      Rate and Rhythm: Normal rate and regular rhythm.      Pulses: Normal pulses.      Heart sounds: Normal heart sounds.   Pulmonary:      Effort: Pulmonary effort is " [facility] Progress Note    Follow-Up on: ESRD    ROS:  Gen - no fever, no chills, appetite unchanged  CV - no chest pain, no palpitation  Lung - shortness of breath better, no cough  Abd - no tenderness, no nausea/vomiting, no diarrhea  Ext - no edema    Exam:  Vitals:    12/10/17 0335 12/10/17 0649 12/10/17 0723 12/10/17 1038   BP: 135/70  145/75 148/73   Pulse: 76  81 73   Resp: 19  20 20   Temp: 100.1 °F (37.8 °C)  97.9 °F (36.6 °C) 99.5 °F (37.5 °C)   SpO2: 100%  98% 98%   Weight:  95 kg (209 lb 8 oz)     Height:             Intake/Output Summary (Last 24 hours) at 12/10/17 1120  Last data filed at 12/10/17 0847   Gross per 24 hour   Intake              120 ml   Output             2000 ml   Net            -1880 ml       Wt Readings from Last 3 Encounters:   12/10/17 95 kg (209 lb 8 oz)   09/22/17 94.8 kg (209 lb)   08/17/17 95.3 kg (210 lb)       GEN - in no distress, on Bipap  CV - regular, no murmur, no rub  Lung - clear bilaterally  Abd - soft, nontender  Ext - no edema    Recent Labs      12/08/17   1204  12/08/17   0541  12/08/17   0045   WBC  5.0  4.8  4.7   HGB  7.8*  8.4*  8.1*   HCT  25.4*  27.3*  26.0*   PLT  107*  121*  123*   INR  1.2  1.2  1.1        Recent Labs      12/09/17   0408  12/08/17   0541   NA  135*  135*   K  3.8  3.8   CL  96*  98   CO2  26  27   BUN  47*  37*   CREA  7.92*  6.45*   CA  8.7  9.4   GLU  115*  127*   PHOS   --   5.0*       Assessment / Plan:  Principal Problem:    Acute pulmonary embolism ([facility] (12/6/2017)    Active Problems:    Lupus (6/19/2014)      Morbid obesity (HCC) (6/19/2014)      CASSIE (obstructive sleep apnea) (7/16/2014)      Overview: BIPAP      DM (diabetes mellitus) ([facility] [facility] (7/16/2014)      ESRD on hemodialysis ([facility] [address] [facility] (8/28/2014)      Orthostatic hypotension ()      Systolic CHF, chronic ([facility] [facility] (10/18/2017)      Acute respiratory failure ([facility] [address] (12/6/2017)      Leg wound, right (12/7/2017)      1. ESRD - HD tomorrow to resume MWF schedule  2.  Pulmonary embolism - s/p thrombolysis - moderate decrease in clot burden  3. H/o CHF  4.  Leg wound normal.      Breath sounds: Normal breath sounds.   Abdominal:      General: Abdomen is flat. Bowel sounds are normal.      Palpations: Abdomen is soft.   Musculoskeletal:         General: Normal range of motion.      Cervical back: Normal range of motion and neck supple.   Skin:     General: Skin is warm and dry.      Comments: Red, flat irritate skin b/l groin and testes.   Neurological:      Mental Status: He is alert.       Administrative Statements

## 2024-07-12 NOTE — PATIENT INSTRUCTIONS
" Walk 30 minutes a day, 5 days a week.  2.  Consider Mediterranean Diet vs. Paleo.  3.  Medications:  consider Wellbutrin.  4.  Behavioral:  address stress eating.      Look for \"Sleep Lady's Guide to Getting Your Child\"      "

## 2024-07-13 PROBLEM — R21 RASH: Status: ACTIVE | Noted: 2024-07-13

## 2024-07-13 PROBLEM — L03.90 CELLULITIS: Status: ACTIVE | Noted: 2024-07-13

## 2024-07-13 NOTE — ASSESSMENT & PLAN NOTE
I suspect the tinea cruris is the major issue.  Will treat the cellulitis but will need to clear the tinea as well.

## 2024-07-29 ENCOUNTER — TELEPHONE (OUTPATIENT)
Age: 44
End: 2024-07-29

## 2024-07-29 NOTE — TELEPHONE ENCOUNTER
Contacted Pt. in regards to ROUTINE Referral, Pt. Added to [facility] for Talk Therapy at this time.    [employer]  In Person Only

## 2024-07-30 ENCOUNTER — TELEPHONE (OUTPATIENT)
Age: 44
End: 2024-07-30

## 2024-07-30 ENCOUNTER — OFFICE VISIT (OUTPATIENT)
Dept: FAMILY MEDICINE CLINIC | Facility: CLINIC | Age: 44
End: 2024-07-30
Payer: COMMERCIAL

## 2024-07-30 VITALS
HEART RATE: 67 BPM | OXYGEN SATURATION: 97 % | WEIGHT: 294 LBS | BODY MASS INDEX: 43.42 KG/M2 | SYSTOLIC BLOOD PRESSURE: 124 MMHG | TEMPERATURE: 97 F | DIASTOLIC BLOOD PRESSURE: 86 MMHG

## 2024-07-30 DIAGNOSIS — Z12.11 COLON CANCER SCREENING: ICD-10-CM

## 2024-07-30 DIAGNOSIS — S80.819A ABRASION OF LOWER EXTREMITY, UNSPECIFIED LATERALITY, INITIAL ENCOUNTER: Primary | ICD-10-CM

## 2024-07-30 PROCEDURE — 99213 OFFICE O/P EST LOW 20 MIN: CPT

## 2024-07-30 RX ORDER — OXYCODONE HYDROCHLORIDE 5 MG/1
5 CAPSULE ORAL EVERY 6 HOURS PRN
COMMUNITY
Start: 2024-07-29 | End: 2024-07-31 | Stop reason: SDUPTHER
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
## 2024-07-31 ENCOUNTER — TELEPHONE (OUTPATIENT)
Age: 44
End: 2024-07-31

## 2024-07-31 DIAGNOSIS — S80.819A ABRASION OF LOWER EXTREMITY, UNSPECIFIED LATERALITY, INITIAL ENCOUNTER: ICD-10-CM
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
## 2024-07-31 RX ORDER — OXYCODONE HYDROCHLORIDE 5 MG/1
5 TABLET ORAL EVERY 6 HOURS PRN
Qty: 6 TABLET | Refills: 0 | Status: SHIPPED | OUTPATIENT
Start: 2024-07-31 | End: 2024-07-31

## 2024-07-31 RX ORDER — OXYCODONE HYDROCHLORIDE 5 MG/1
5 TABLET ORAL EVERY 6 HOURS PRN
Qty: 6 TABLET | Refills: 0 | Status: SHIPPED | OUTPATIENT
Start: 2024-07-31

## 2024-07-31 NOTE — TELEPHONE ENCOUNTER
Patient called the office back to check on his medication refill. Pt was made aware the provider has not got a chance to get back to the message as of yet and he will be contacted back soon. Pt verbally understands no further questions

## 2024-07-31 NOTE — TELEPHONE ENCOUNTER
Patient called the office asking if the provider can possibly order him a refill on his oxycodone 10 mg instead of 5 mg. Pt states he would like something stronger. Please review and advise.

## 2024-08-01 ENCOUNTER — TELEPHONE (OUTPATIENT)
Age: 44
End: 2024-08-01

## 2024-08-01 DIAGNOSIS — S80.819A ABRASION OF LOWER EXTREMITY, UNSPECIFIED LATERALITY, INITIAL ENCOUNTER: Primary | ICD-10-CM
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
## 2024-08-01 DIAGNOSIS — S80.819A ABRASION OF LOWER EXTREMITY, UNSPECIFIED LATERALITY, INITIAL ENCOUNTER: ICD-10-CM
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
## 2024-08-01 RX ORDER — CASTOR OIL
OIL (ML) ORAL DAILY
Qty: 18 EACH | Refills: 0 | Status: SHIPPED | OUTPATIENT
Start: 2024-08-01

## 2024-08-01 RX ORDER — OXYCODONE HYDROCHLORIDE 5 MG/1
5 TABLET ORAL EVERY 6 HOURS PRN
Qty: 6 TABLET | Refills: 0 | OUTPATIENT
Start: 2024-08-01

## 2024-08-01 NOTE — TELEPHONE ENCOUNTER
Pt called, is unable to go to [facility] to  bandages.  He is kindly asking that  the provider call the pharmacist at [facility].  [facility] does carry bandages - they just need a prescription. He needs gauze, surgical cloth tape, and or surgical cloth/medical bandage tape.  Once we contact [facility] please call patient back so he knows if [facility] has what he is requesting via prescription, and/or if he needs to purchase certain items OTC.  He is not concerned about who pays, his auto insurance should cover.

## 2024-08-01 NOTE — TELEPHONE ENCOUNTER
The order must say the compression amount. Please edit the referral to say compression bandage amount 20-25mmHG and fax to [phone]

## 2024-08-01 NOTE — TELEPHONE ENCOUNTER
Pt called stating that [facility] had not recd script for bandage. Warm transfer to [facility].

## 2024-08-01 NOTE — TELEPHONE ENCOUNTER
Patient would like to know if [facility] delivers. He is not happy that he has to travel to [place]. He doesn't know why [facility] can't be sent this script when [facility] can fill it. Please call patient back with info/when order is ready either at [facility].

## 2024-08-01 NOTE — TELEPHONE ENCOUNTER
Spoke with pt who said he wanted script for bandages sent to [facility]. Dmitriy is sending the order there.  I tried to explain to pt that DME orders don't usually go to a pharmacy and that is why we suggested he call his insurance to find out where we should fax the DME order. Pt stated he called insurance and we should send to [facility].

## 2024-08-01 NOTE — TELEPHONE ENCOUNTER
Patient called and stated that he needs more bandages and that he has not rec;d a call back in 3 hrs.  I relayed the PCP message.  He insisted we call the adjustor and get the fax number to where we need to send the order to.  I advised him again that he needs to obtain the fax number we can send the order for the bandages to and we will fax for him.  He finally agreed to call us back with a fax number.    Secondly he was wanting Dmitriy to give him additional Oxycodone.  I advised him twice that he was not able to do that.  He verbal understood.

## 2024-08-01 NOTE — TELEPHONE ENCOUNTER
Patient returned call and stated to please fax the script for bandages to [facility], Fax - [phone]

## 2024-08-01 NOTE — TELEPHONE ENCOUNTER
Pt called, is kindly requesting a refill on the medication.  In addition, he needs to change the bandages on his foot/ankle.  Can the provider send a script - would like better quality bandage, not OTC.  Please advise. Also, can the provider increase from 5m to 10 mg?   Patient is requesting a return call prior to medication/bandagesbeing sent to the pharmacy.     Medication: oxyCODONE (Roxicodone) 5 immediate release tablet   *pt requesting 10mg*    Dose/Frequency: Take 1 tablet (5 mg total) by mouth every 6 (six) hours as needed for moderate pain Max Daily Amount: 20 mg   *pt requesting increase in dose*    Quantity: pt requested enough medication to get him through the weekend.     Pharmacy: [id number] - [place] - [address]     Office:   [x] PCP/Provider - : Dmitriy Simpson PA-C   [] Speciality/Provider -     Does the patient have enough for 3 days?   [] Yes   [x] No - Send as HP to POD

## 2024-08-01 NOTE — TELEPHONE ENCOUNTER
Patient called the office asking if we can send his order for bandages to [facility] pharmacy. Patient also would like to get this order bill to his auto insurance not private insurance. Please review and advise.

## 2024-08-01 NOTE — TELEPHONE ENCOUNTER
Tried to reach pt. No answer and vm not set up. If pt calls back please see message from Dmitriy and find out what medical supply he wants the script for bandages to go to. They cannot go to a pharmacy.

## 2024-08-02 ENCOUNTER — TELEPHONE (OUTPATIENT)
Age: 44
End: 2024-08-02

## 2024-08-02 ENCOUNTER — OFFICE VISIT (OUTPATIENT)
Dept: URGENT CARE | Facility: CLINIC | Age: 44
End: 2024-08-02
Payer: COMMERCIAL

## 2024-08-02 VITALS
DIASTOLIC BLOOD PRESSURE: 82 MMHG | BODY MASS INDEX: 44.27 KG/M2 | SYSTOLIC BLOOD PRESSURE: 135 MMHG | HEART RATE: 96 BPM | OXYGEN SATURATION: 99 % | WEIGHT: 299.8 LBS | TEMPERATURE: 98.1 F

## 2024-08-02 DIAGNOSIS — T14.8XXA ABRASION: Primary | ICD-10-CM

## 2024-08-02 DIAGNOSIS — S80.819A ABRASION OF LOWER EXTREMITY, UNSPECIFIED LATERALITY, INITIAL ENCOUNTER: Primary | ICD-10-CM

## 2024-08-02 PROCEDURE — G0382 LEV 3 HOSP TYPE B ED VISIT: HCPCS

## 2024-08-02 RX ORDER — GINSENG 100 MG
1 CAPSULE ORAL ONCE
Status: COMPLETED | OUTPATIENT
Start: 2024-08-02 | End: 2024-08-02

## 2024-08-02 RX ADMIN — Medication 1 SMALL APPLICATION: at 14:00

## 2024-08-02 NOTE — PATIENT INSTRUCTIONS
Please use the antibiotic ointment you were previously prescribed.   Wash your wounds with soap and water twice daily. Change dressings about 2x/day or open to air if in clean environment. You may use regular large band-aides or gauze pads.    Allow superficial wounds open to air when you are in clean environment.    Please follow up with your primary provider in the next several days. Should you have any worsening of symptoms, or lack of improvement please be re-evaluated. If needed for significant concerns, consider 911 or ER evaluation.

## 2024-08-02 NOTE — TELEPHONE ENCOUNTER
Patient spoke to [facility] this morning. They do not stock the compression badages that were ordered. They are able to order them but patient needs the dressing changed today. The AC is letting patient know if there is bleeding and drainage he is to go to the Care now for evaluation today.     Is there another type of supply that is able to be sent that they may stock at the local pharmacy?     Thank you
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
## 2024-08-02 NOTE — TELEPHONE ENCOUNTER
Pt called, he received a call from the [facility] this morning 8/2 - they told him they do NOT carry compression bandages.  They only have compression socks.     Pt asked if he could come into the PCP office today to have the bandage changed. S/W Mehul Saeed who advised Dmitriy is not in the office today. She will send a message to the provider, when we hear back will reach out to the patient. Darlin is also working with suppliers on who might carry the bandages needed. Relayed this to the pt.    Assisted patient with locating a [facility] - advised if bleeding/needs evaluation, bandage changed, care now should be able assist.  Provided phone#, pt is calling the [facility] for more information. ED is also an option if medically necessary.     Pt thanked us for our help.

## 2024-08-02 NOTE — TELEPHONE ENCOUNTER
Pt ira missed call and did not know what it was for, I was able to get in touch with Paige at the [place] office and warm transferred the call to her.

## 2024-08-07 ENCOUNTER — TELEPHONE (OUTPATIENT)
Age: 44
End: 2024-08-07

## 2024-08-07 ENCOUNTER — RA CDI HCC (OUTPATIENT)
Dept: OTHER | Facility: HOSPITAL | Age: 44
End: 2024-08-07

## 2024-08-07 PROBLEM — E66.01 MORBID OBESITY WITH BMI OF 40.0-44.9, ADULT (HCC): Status: ACTIVE | Noted: 2024-08-07

## 2024-08-07 NOTE — TELEPHONE ENCOUNTER
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 Pt stated that the needs this test today or tomorrow.    No order in system. Please advise and/or schedule accordingly.

## 2024-08-07 NOTE — TELEPHONE ENCOUNTER
Pt aware he is scheduled for physical next week and will be coming in at 330 for ppd as ppd will need to be read on Friday and office is only open until 4.       Dmitriy can you place the ppd order for next week that way when he comes we can give it right aware

## 2024-08-14 ENCOUNTER — CLINICAL SUPPORT (OUTPATIENT)
Dept: URGENT CARE | Facility: CLINIC | Age: 44
End: 2024-08-14

## 2024-08-14 ENCOUNTER — TELEPHONE (OUTPATIENT)
Dept: FAMILY MEDICINE CLINIC | Facility: CLINIC | Age: 44
End: 2024-08-14

## 2024-08-14 ENCOUNTER — APPOINTMENT (OUTPATIENT)
Dept: URGENT CARE | Facility: CLINIC | Age: 44
End: 2024-08-14

## 2024-08-14 DIAGNOSIS — Z11.1 SCREENING-PULMONARY TB: Primary | ICD-10-CM

## 2024-08-14 DIAGNOSIS — Z11.1 SCREENING FOR TUBERCULOSIS: Primary | ICD-10-CM

## 2024-08-14 PROCEDURE — 86580 TB INTRADERMAL TEST: CPT

## 2024-08-14 NOTE — TELEPHONE ENCOUNTER
Pt called to confirm if order for PPD was sent to [facility] urgent care. Warm transferred pt to [facility] in the practice.

## 2024-08-14 NOTE — TELEPHONE ENCOUNTER
Pt returned call stating just spoke to Adelita and would like to speak her again warm transferred the call and was answered by Adelita and she took the patient call. Thanks

## 2024-08-15 NOTE — TELEPHONE ENCOUNTER
Patient calling to speak with Adelita at clinical team. Will not speak to anyone else. Beverley transferred pt to [place] clerical team to assist.

## 2024-08-15 NOTE — TELEPHONE ENCOUNTER
Patient called the office asking to speak to one of the girls in the front. Patient did not want my assistance. Call warm transferred to the office for assistance

## 2024-08-19 ENCOUNTER — TELEPHONE (OUTPATIENT)
Age: 44
End: 2024-08-19

## 2024-08-19 NOTE — TELEPHONE ENCOUNTER
Patient called in and stated that he will be stopping in tomorrow to have Dmitriy sign off on a paper work that he missed on his paperwork.    Thank you

## 2024-11-12 DIAGNOSIS — F33.9 RECURRENT DEPRESSION (HCC): ICD-10-CM

## 2024-11-12 DIAGNOSIS — F41.1 GAD (GENERALIZED ANXIETY DISORDER): ICD-10-CM

## 2024-11-13 RX ORDER — ESCITALOPRAM OXALATE 20 MG/1
20 TABLET ORAL DAILY
Qty: 90 TABLET | Refills: 1 | Status: SHIPPED | OUTPATIENT
Start: 2024-11-13

## 2024-12-19 ENCOUNTER — TELEPHONE (OUTPATIENT)
Age: 44
End: 2024-12-19

## 2024-12-19 ENCOUNTER — TELEMEDICINE (OUTPATIENT)
Dept: FAMILY MEDICINE CLINIC | Facility: CLINIC | Age: 44
End: 2024-12-19
Payer: COMMERCIAL

## 2024-12-19 DIAGNOSIS — J06.9 VIRAL URI WITH COUGH: Primary | ICD-10-CM

## 2024-12-19 PROCEDURE — 99213 OFFICE O/P EST LOW 20 MIN: CPT | Performed by: FAMILY MEDICINE

## 2024-12-19 RX ORDER — BENZONATATE 200 MG/1
200 CAPSULE ORAL 3 TIMES DAILY PRN
Qty: 20 CAPSULE | Refills: 0 | Status: SHIPPED | OUTPATIENT
Start: 2024-12-19

## 2024-12-19 RX ORDER — FLUTICASONE PROPIONATE 50 MCG
1 SPRAY, SUSPENSION (ML) NASAL DAILY
Qty: 9.9 ML | Refills: 0 | Status: SHIPPED | OUTPATIENT
Start: 2024-12-19 | End: 2024-12-19

## 2024-12-19 RX ORDER — FLUTICASONE PROPIONATE 50 MCG
1 SPRAY, SUSPENSION (ML) NASAL DAILY
Qty: 9.9 ML | Refills: 0 | Status: SHIPPED | OUTPATIENT
Start: 2024-12-19

## 2024-12-19 RX ORDER — BENZONATATE 200 MG/1
200 CAPSULE ORAL 3 TIMES DAILY PRN
Qty: 20 CAPSULE | Refills: 0 | Status: SHIPPED | OUTPATIENT
Start: 2024-12-19 | End: 2024-12-19

## 2024-12-19 NOTE — PROGRESS NOTES
Virtual Regular Visit  Name: Tristan Espino      : 1980      MRN: [id number]  Encounter Provider: Krystal Wei DO  Encounter Date: 2024   Encounter department: [facility]      Verification of patient location:  Patient is located at Work in the following state in which I hold an active license [place] :  Assessment & Plan  Viral URI with cough    Patient with signs and symptoms consistent with viral URI.  Recommend completing home COVID test.  Recommend continued conservative management with increased rest and hydration.  May trial Tessalon Perles and Flonase as needed for symptomatic relief.  Recommendation to follow-up with PCP in 1 to 2 weeks or sooner as needed should symptoms persist or worsen.    Orders:  •  benzonatate (TESSALON) 200 MG capsule; Take 1 capsule (200 mg total) by mouth 3 (three) times a day as needed for cough  •  fluticasone (FLONASE) 50 mcg/act nasal spray; 1 spray into each nostril daily        Encounter provider Krystal Wei DO    The patient was identified by name and date of birth. Tristan Espino was informed that this is a telemedicine visit and that the visit is being conducted through the Epic Embedded platform. He agrees to proceed..  My office door was closed. No one else was in the room.  He acknowledged consent and understanding of privacy and security of the video platform. The patient has agreed to participate and understands they can discontinue the visit at any time.    Patient is aware this is a billable service.     History of Present Illness     Alex is a 44-year-old male with past medical history of Paul's esophagus, MDD, and obesity who presents for virtual evaluation regarding symptoms of upper respiratory illness.    Patient notes that he started with symptoms yesterday with coughing and nasal drainage.  Notes that he has developed myalgias and fatigue today.  Denies associated fevers or chills.  Does note exertional dyspnea.   There is congestion with sinus pressure, postnasal drip and sore throat.  Notes he has taken DayQuil as needed for symptomatic relief.  He has not completed a home COVID test.      Review of Systems   Constitutional:  Negative for chills and fever.   HENT:  Positive for congestion, sinus pressure and sore throat.    Respiratory:  Positive for cough. Negative for shortness of breath.    Cardiovascular:  Negative for chest pain.   Musculoskeletal:  Positive for myalgias.       Objective   There were no vitals taken for this visit.    Physical Exam  Constitutional:       General: He is not in acute distress.     Appearance: Normal appearance.   HENT:      Right Ear: External ear normal.      Left Ear: External ear normal.      Mouth/Throat:      Mouth: Mucous membranes are moist.   Eyes:      General: No scleral icterus.        Right eye: No discharge.         Left eye: No discharge.      Conjunctiva/sclera: Conjunctivae normal.   Pulmonary:      Comments: No conversational dyspnea noted  Musculoskeletal:      Cervical back: Normal range of motion.   Neurological:      Mental Status: He is alert.   Psychiatric:         Mood and Affect: Mood normal.         Behavior: Behavior normal.         Visit Time  Total Visit Duration: 20

## 2024-12-19 NOTE — LETTER
December 19, 2024     Patient: Tristan Espino  YOB: 1980  Date of Visit: 12/19/2024      To Whom it May Concern:    Tristan Espino is under my professional care. Tristan was seen in my office on 12/19/2024. Tristan may return to work on 12/21/24 .    If you have any questions or concerns, please don't hesitate to call.         Sincerely,          Krystal Wei,         CC: No Recipients

## 2024-12-19 NOTE — TELEPHONE ENCOUNTER
Roselia wife called to schedule an appointment today for a cough that Tristan has for 1-2 days. NO OTC meds taken.    No provider in office recommended UC for evaluations.

## 2024-12-23 DIAGNOSIS — F98.8 ATTENTION DEFICIT DISORDER (ADD) WITHOUT HYPERACTIVITY: ICD-10-CM

## 2024-12-23 RX ORDER — ATOMOXETINE 40 MG/1
40 CAPSULE ORAL EVERY MORNING
Qty: 90 CAPSULE | Refills: 1 | Status: SHIPPED | OUTPATIENT
Start: 2024-12-23

## 2024-12-23 NOTE — TELEPHONE ENCOUNTER
Medication: atoMOXetine (STRATTERA) 40 mg capsule     Dose/Frequency: Take 1 capsule (40 mg total) by mouth every morning     Quantity: 90 capsule     Pharmacy: [id number] - [place] - [address]     Office:   [x] PCP/Provider - Dmitriy Simpson PA-C   [] Speciality/Provider -     Does the patient have enough for 3 days?   [] Yes   [x] No - Send as HP to POD

## 2025-01-06 DIAGNOSIS — R21 RASH: ICD-10-CM

## 2025-01-06 DIAGNOSIS — S80.819A ABRASION OF LOWER EXTREMITY, UNSPECIFIED LATERALITY, INITIAL ENCOUNTER: ICD-10-CM

## 2025-01-08 RX ORDER — KETOCONAZOLE 20 MG/G
CREAM TOPICAL DAILY
Qty: 60 G | Refills: 5 | Status: SHIPPED | OUTPATIENT
Start: 2025-01-08

## 2025-01-08 RX ORDER — MUPIROCIN 20 MG/G
1 OINTMENT TOPICAL 2 TIMES DAILY
Qty: 22 G | Refills: 0 | Status: SHIPPED | OUTPATIENT
Start: 2025-01-08

## 2025-03-19 ENCOUNTER — OFFICE VISIT (OUTPATIENT)
Dept: FAMILY MEDICINE CLINIC | Facility: CLINIC | Age: 45
End: 2025-03-19
Payer: COMMERCIAL

## 2025-03-19 VITALS
DIASTOLIC BLOOD PRESSURE: 106 MMHG | SYSTOLIC BLOOD PRESSURE: 142 MMHG | TEMPERATURE: 96.8 F | OXYGEN SATURATION: 97 % | BODY MASS INDEX: 44.33 KG/M2 | WEIGHT: 300.2 LBS | HEART RATE: 99 BPM

## 2025-03-19 DIAGNOSIS — A08.4 VIRAL GASTROENTERITIS: Primary | ICD-10-CM

## 2025-03-19 PROCEDURE — 99213 OFFICE O/P EST LOW 20 MIN: CPT

## 2025-03-19 RX ORDER — ONDANSETRON 4 MG/1
4 TABLET, FILM COATED ORAL EVERY 8 HOURS PRN
Qty: 20 TABLET | Refills: 0 | Status: SHIPPED | OUTPATIENT
Start: 2025-03-19

## 2025-03-19 NOTE — PROGRESS NOTES
Name: Tristan Espino      : 1980      MRN: [id number]  Encounter Provider: Dmitriy Simpson PA-C  Encounter Date: 3/19/2025   Encounter department: [facility]  :  Assessment & Plan  Viral gastroenteritis  Recommend treatment with over-the-counter and home remedies.  Recommend hydration, and educated on adequate hydration based on weight and frequency of episodes.  Recommend adequate age-appropriate nutrition as tolerated.  Discussed the anticipated course of viral gastroenteritis.  Patient to follow-up if symptoms worsen or do not improve as discussed.       Will prescribe Zofran for nausea.  Educated on side effects.  Contact office if these occur.  Orders:    ondansetron (ZOFRAN) 4 mg tablet; Take 1 tablet (4 mg total) by mouth every 8 (eight) hours as needed for nausea or vomiting           History of Present Illness   Patient is a 44-year-old male presenting for GI illness for 3 days.  Improving greatly today.  Nausea, abdominal pain, vomiting, diarrhea.  Denies hematemesis, blood in stool, constipation.  Sick contact with son who had same symptoms prior to him.  No other questions or concerns.      Review of Systems   Constitutional:  Negative for appetite change, chills, diaphoresis, fatigue and fever.   HENT:  Negative for congestion, ear discharge, ear pain, postnasal drip, rhinorrhea, sinus pressure, sinus pain, sneezing and sore throat.    Eyes:  Negative for pain, discharge, redness, itching and visual disturbance.   Respiratory:  Negative for apnea, cough, chest tightness, shortness of breath and wheezing.    Cardiovascular:  Negative for chest pain, palpitations and leg swelling.   Gastrointestinal:  Positive for abdominal pain, diarrhea, nausea and vomiting. Negative for blood in stool and constipation.   Endocrine: Negative for cold intolerance, heat intolerance, polydipsia and polyuria.   Genitourinary:  Negative for dysuria, flank pain, frequency, hematuria and  urgency.   Musculoskeletal:  Negative for arthralgias, back pain, myalgias, neck pain and neck stiffness.   Skin:  Negative for color change and rash.   Allergic/Immunologic: Negative.    Neurological:  Negative for dizziness, tremors, seizures, syncope, facial asymmetry, speech difficulty, weakness, light-headedness, numbness and headaches.   Hematological:  Negative for adenopathy. Does not bruise/bleed easily.   Psychiatric/Behavioral:  Negative for agitation, confusion, decreased concentration, dysphoric mood, hallucinations, self-injury, sleep disturbance and suicidal ideas. The patient is not nervous/anxious and is not hyperactive.    All other systems reviewed and are negative.      Objective   BP (!) 142/106 (BP Location: Left arm, Patient Position: Sitting)   Pulse 99   Temp (!) 96.8 °F (36 °C) (Temporal)   Wt (!) 136 kg (300 lb 3.2 oz)   SpO2 97%   BMI 44.33 kg/m²      Physical Exam  Vitals and nursing note reviewed.   Constitutional:       General: He is not in acute distress.     Appearance: Normal appearance. He is well-developed. He is obese. He is not ill-appearing, toxic-appearing or diaphoretic.   HENT:      Head: Normocephalic and atraumatic.      Right Ear: Tympanic membrane normal.      Left Ear: Tympanic membrane normal.      Nose: Nose normal.      Mouth/Throat:      Mouth: Mucous membranes are moist.      Pharynx: Oropharynx is clear.   Eyes:      Extraocular Movements: Extraocular movements intact.      Conjunctiva/sclera: Conjunctivae normal.      Pupils: Pupils are equal, round, and reactive to light.   Neck:      Vascular: No carotid bruit.   Cardiovascular:      Rate and Rhythm: Normal rate and regular rhythm.      Pulses: Normal pulses.      Heart sounds: Normal heart sounds. No murmur heard.  Pulmonary:      Effort: Pulmonary effort is normal. No respiratory distress.      Breath sounds: Normal breath sounds. No wheezing.   Chest:      Chest wall: No tenderness.   Abdominal:       General: Bowel sounds are normal. There is no distension.      Palpations: Abdomen is soft. There is no mass.      Tenderness: There is abdominal tenderness (LUQ). There is no right CVA tenderness, left CVA tenderness, guarding or rebound.      Hernia: A hernia (Umbilical) is present.   Musculoskeletal:         General: No swelling or tenderness. Normal range of motion.      Cervical back: Normal range of motion and neck supple. No tenderness.      Right lower leg: No edema.      Left lower leg: No edema.   Lymphadenopathy:      Cervical: No cervical adenopathy.   Skin:     General: Skin is warm and dry.      Capillary Refill: Capillary refill takes less than 2 seconds.      Findings: No erythema or rash.   Neurological:      General: No focal deficit present.      Mental Status: He is alert and oriented to person, place, and time. Mental status is at baseline.      Cranial Nerves: No cranial nerve deficit.      Motor: No weakness.      Coordination: Coordination normal.      Gait: Gait normal.   Psychiatric:         Mood and Affect: Mood normal.         Behavior: Behavior normal.         Thought Content: Thought content normal.         Judgment: Judgment normal.

## 2025-03-19 NOTE — LETTER
March 19, 2025     Patient: Tristan Espino  YOB: 1980  Date of Visit: 3/19/2025      To Whom it May Concern:    Tristan Espino is under my professional care. Tristan was seen in my office on 3/19/2025. Tristan may return to work on 3/20/2025.  Excused from 3/17-3/19 due to illness .    If you have any questions or concerns, please don't hesitate to call.         Sincerely,          Dmitriy Simpson PA-C        CC: No Recipients

## 2025-03-19 NOTE — LETTER
March 19, 2025     Patient: Tristan Espino  YOB: 1980  Date of Visit: 3/19/2025      To Whom it May Concern:    Tristan Espino is under my professional care. Tristan was seen in my office on 3/19/2025. Tristan may return to work on 3/20/2025 .    If you have any questions or concerns, please don't hesitate to call.         Sincerely,          Dmitriy Simpson PA-C        CC: No Recipients

## 2025-05-13 ENCOUNTER — OFFICE VISIT (OUTPATIENT)
Dept: URGENT CARE | Facility: CLINIC | Age: 45
End: 2025-05-13
Payer: COMMERCIAL

## 2025-05-13 VITALS
HEIGHT: 69 IN | SYSTOLIC BLOOD PRESSURE: 138 MMHG | WEIGHT: 300 LBS | BODY MASS INDEX: 44.43 KG/M2 | HEART RATE: 101 BPM | TEMPERATURE: 97.6 F | OXYGEN SATURATION: 99 % | RESPIRATION RATE: 18 BRPM | DIASTOLIC BLOOD PRESSURE: 92 MMHG

## 2025-05-13 DIAGNOSIS — R68.89 FLU-LIKE SYMPTOMS: Primary | ICD-10-CM

## 2025-05-13 PROCEDURE — S9083 URGENT CARE CENTER GLOBAL: HCPCS | Performed by: PHYSICIAN ASSISTANT

## 2025-05-13 PROCEDURE — G0382 LEV 3 HOSP TYPE B ED VISIT: HCPCS | Performed by: PHYSICIAN ASSISTANT

## 2025-05-13 NOTE — LETTER
May 13, 2025     Patient: Tristan Espino   YOB: 1980   Date of Visit: 5/13/2025       To Whom it May Concern:    Tristan Espino was seen in my clinic on 5/13/2025. Please excuse patient from work on 5/12-5/14/25.    If you have any questions or concerns, please don't hesitate to call.         Sincerely,          Nicky Dyson PA-C        CC: No Recipients

## 2025-05-13 NOTE — PROGRESS NOTES
[facility]        NAME: Tristan Espino is a 44 y.o. male  : 1980    MRN: [id number]  DATE: May 13, 2025  TIME: 1:26 PM    Assessment and Plan   Flu-like symptoms [R68.89]  1. Flu-like symptoms              Patient Instructions     Take medicine as prescribed  Follow up with PCP in 3-5 days.  Proceed to  ER if symptoms worsen.    If tests have been performed at [facility], our office will contact you with results if changes need to be made to the care plan discussed with you at the visit.  You can review your full results on [facility].    Chief Complaint     Chief Complaint   Patient presents with    Cold Like Symptoms     Scratchy throat, fatigue, cough, achy , temp 99.6  X 3 days         History of Present Illness       URI   This is a new problem. Episode onset: 5/10/25. Maximum temperature: tmax 99.8. Associated symptoms include congestion, coughing (productive), headaches, rhinorrhea and a sore throat. Pertinent negatives include no ear pain, nausea or vomiting. Associated symptoms comments: Myalgias, ear fullness. Treatments tried: mucinex, dayquil.   Patient did receive flu shot this year.    Review of Systems   Review of Systems   Constitutional:  Negative for fatigue and fever.   HENT:  Positive for congestion, rhinorrhea and sore throat. Negative for ear pain.    Respiratory:  Positive for cough (productive).    Gastrointestinal:  Negative for nausea and vomiting.   Neurological:  Positive for headaches.         Current Medications       Current Outpatient Medications:     atoMOXetine (STRATTERA) 40 mg capsule, Take 1 capsule (40 mg total) by mouth every morning, Disp: 90 capsule, Rfl: 1    escitalopram (LEXAPRO) 20 mg tablet, take 1 tablet by mouth once daily, Disp: 90 tablet, Rfl: 1    benzonatate (TESSALON) 200 MG capsule, Take 1 capsule (200 mg total) by mouth 3 (three) times a day as needed for cough (Patient not taking: Reported on 2025), Disp: 20 capsule, Rfl: 0     "Elastic Bandages & Supports (Bandage Roll 4.5\" x 144\") MISC, Use 1 Units daily (Patient not taking: Reported on 5/13/2025), Disp: 1 each, Rfl: 1    Elastic Bandages & Supports (Elastic Bandage 4\") MISC, Use daily (Patient not taking: Reported on 5/13/2025), Disp: 1 each, Rfl: 2    fluticasone (FLONASE) 50 mcg/act nasal spray, 1 spray into each nostril daily (Patient not taking: Reported on 5/13/2025), Disp: 9.9 mL, Rfl: 0    Gauze Pads & Dressings (BRAND NEW GENERATION LARGE) PADS, Use daily (Patient not taking: Reported on 5/13/2025), Disp: 18 each, Rfl: 0    Gauze Pads & Dressings 4\"X10\" PADS, Use daily (Patient not taking: Reported on 5/13/2025), Disp: 10 each, Rfl: 1    ketoconazole (NIZORAL) 2 % cream, Apply topically daily (Patient not taking: Reported on 5/13/2025), Disp: 60 g, Rfl: 5    mupirocin (BACTROBAN) 2 % ointment, apply topically twice a day (Patient not taking: Reported on 5/13/2025), Disp: 22 g, Rfl: 0    ondansetron (ZOFRAN) 4 mg tablet, Take 1 tablet (4 mg total) by mouth every 8 (eight) hours as needed for nausea or vomiting (Patient not taking: Reported on 5/13/2025), Disp: 20 tablet, Rfl: 0    oxyCODONE (Roxicodone) 5 immediate release tablet, Take 1 tablet (5 mg total) by mouth every 6 (six) hours as needed for moderate pain Max Daily Amount: 20 mg (Patient not taking: Reported on 5/13/2025), Disp: 6 tablet, Rfl: 0    Current Allergies     Allergies as of 05/13/2025    (No Known Allergies)            The following portions of the patient's history were reviewed and updated as appropriate: allergies, current medications, past family history, past medical history, past social history, past surgical history and problem list.     Past Medical History:   Diagnosis Date    Anxiety     Cellulitis     Depression     MRSA (methicillin resistant Staphylococcus aureus)        Past Surgical History:   Procedure Laterality Date    APPENDECTOMY  2019    HERNIA REPAIR      SHOULDER SURGERY         Family " "History   Problem Relation Age of Onset    Breast cancer Mother     Diabetes Mother     Bone cancer Father     Heart attack Paternal Grandmother     Breast cancer Maternal Aunt          Medications have been verified.        Objective   /92   Pulse 101   Temp 97.6 °F (36.4 °C)   Resp 18   Ht 5' 9\" (1.753 m)   Wt 136 kg (300 lb)   SpO2 99%   BMI 44.30 kg/m²   No LMP for male patient.       Physical Exam     Physical Exam  Constitutional:       Appearance: He is well-developed.   HENT:      Head: Normocephalic.      Right Ear: Hearing, tympanic membrane, ear canal and external ear normal. There is no impacted cerumen.      Left Ear: Hearing, tympanic membrane, ear canal and external ear normal. There is no impacted cerumen.      Nose: Congestion and rhinorrhea present. No mucosal edema.      Mouth/Throat:      Pharynx: Posterior oropharyngeal erythema present. No oropharyngeal exudate.      Tonsils: No tonsillar exudate.   Cardiovascular:      Rate and Rhythm: Normal rate and regular rhythm.      Heart sounds: Normal heart sounds. No murmur heard.     No friction rub. No gallop.   Pulmonary:      Effort: Pulmonary effort is normal. No respiratory distress.      Breath sounds: Normal breath sounds. No stridor. No decreased breath sounds, wheezing, rhonchi or rales.   Abdominal:      General: Bowel sounds are normal. There is no distension.      Palpations: Abdomen is soft. There is no mass.      Tenderness: There is no abdominal tenderness. There is no guarding or rebound.   Lymphadenopathy:      Cervical: No cervical adenopathy.      Right cervical: No superficial cervical adenopathy.     Left cervical: No superficial cervical adenopathy.   Neurological:      Mental Status: He is alert.                   "

## 2025-05-14 ENCOUNTER — TELEPHONE (OUTPATIENT)
Dept: URGENT CARE | Facility: CLINIC | Age: 45
End: 2025-05-14

## 2025-05-14 DIAGNOSIS — R68.89 FLU-LIKE SYMPTOMS: Primary | ICD-10-CM

## 2025-05-14 RX ORDER — PREDNISONE 20 MG/1
40 TABLET ORAL DAILY
Qty: 10 TABLET | Refills: 0 | Status: SHIPPED | OUTPATIENT
Start: 2025-05-14 | End: 2025-05-19

## 2025-05-14 NOTE — TELEPHONE ENCOUNTER
Patient called stating he was seen yesterday and prescribed prednisone but when he went to the pharmacy they did not have the script on file. He would like it resent to [facility] in [place].

## 2025-05-22 ENCOUNTER — OFFICE VISIT (OUTPATIENT)
Dept: URGENT CARE | Facility: CLINIC | Age: 45
End: 2025-05-22
Payer: COMMERCIAL

## 2025-05-22 ENCOUNTER — APPOINTMENT (OUTPATIENT)
Dept: RADIOLOGY | Facility: CLINIC | Age: 45
End: 2025-05-22
Payer: COMMERCIAL

## 2025-05-22 VITALS
DIASTOLIC BLOOD PRESSURE: 84 MMHG | OXYGEN SATURATION: 97 % | WEIGHT: 300 LBS | TEMPERATURE: 97.7 F | SYSTOLIC BLOOD PRESSURE: 126 MMHG | RESPIRATION RATE: 19 BRPM | HEART RATE: 95 BPM | HEIGHT: 69 IN | BODY MASS INDEX: 44.43 KG/M2

## 2025-05-22 DIAGNOSIS — R05.1 ACUTE COUGH: ICD-10-CM

## 2025-05-22 DIAGNOSIS — J22 LOWER RESPIRATORY INFECTION (E.G., BRONCHITIS, PNEUMONIA, PNEUMONITIS, PULMONITIS): Primary | ICD-10-CM

## 2025-05-22 PROCEDURE — S9083 URGENT CARE CENTER GLOBAL: HCPCS

## 2025-05-22 PROCEDURE — 71046 X-RAY EXAM CHEST 2 VIEWS: CPT

## 2025-05-22 PROCEDURE — G0383 LEV 4 HOSP TYPE B ED VISIT: HCPCS

## 2025-05-22 RX ORDER — ACETAMINOPHEN 500 MG
1000 TABLET ORAL EVERY 6 HOURS PRN
COMMUNITY

## 2025-05-22 RX ORDER — AZITHROMYCIN 250 MG/1
TABLET, FILM COATED ORAL
Qty: 6 TABLET | Refills: 0 | Status: SHIPPED | OUTPATIENT
Start: 2025-05-22 | End: 2025-05-26

## 2025-05-22 RX ORDER — BENZONATATE 100 MG/1
100 CAPSULE ORAL 3 TIMES DAILY PRN
Qty: 20 CAPSULE | Refills: 0 | Status: SHIPPED | OUTPATIENT
Start: 2025-05-22 | End: 2025-05-29

## 2025-05-22 RX ORDER — GUAIFENESIN 600 MG/1
1200 TABLET, EXTENDED RELEASE ORAL EVERY 12 HOURS SCHEDULED
COMMUNITY

## 2025-05-22 NOTE — PROGRESS NOTES
[facility]        NAME: Tristan Espino is a 44 y.o. male  : 1980    MRN: [id number]  DATE: May 22, 2025  TIME: 4:05 PM    Assessment and Plan   Lower respiratory infection (e.g., bronchitis, pneumonia, pneumonitis, pulmonitis) [J22]  1. Lower respiratory infection (e.g., bronchitis, pneumonia, pneumonitis, pulmonitis)  XR chest pa and lateral    azithromycin (ZITHROMAX) 250 mg tablet    benzonatate (TESSALON PERLES) 100 mg capsule        Previous UC note reviewed. Lungs clear on PE and VSS. Preliminary chest XR read negative, final read pending. Given symptom duration x10 days and recent completion of oral steroids, will treat with Azithromycin and Tessalon Perles PRN. Encouraged continued supportive measures.  Follow up with PCP in 3-5 days or proceed to emergency department for worsening symptoms.  Patient verbalized understanding of instructions given.        Patient Instructions     Preliminary XR read negative, final read pending  Take antibiotic as prescribed  Continue with supportive measures, OTC Tylenol/Ibuprofen, nasal decongestants, and cough suppressants (Tessalon Perles)   Cool mist humidifiers, throat lozenges, salt gargles, honey, increased fluid intake and rest   Follow up with PCP in 3-5 days  Present to ER if symptoms worsen       If tests have been performed at [facility], our office will contact you with results if changes need to be made to the care plan discussed with you at the visit.  You can review your full results on [facility].    Chief Complaint     Chief Complaint   Patient presents with    Cough     C/O persistent cough, onset 11 days ago, seen here 25 for flu like symptoms, took prednisone as prescribed. Reports cough continues and feeling fatigued. Rhonchi noted with some clearance with cough.         History of Present Illness       44-year-old male with no significant past medical history presents with complaints of ongoing cough and chest congestion  "x 10 days.  Reports initially seen at this urgent care facility for fever, chills, nasal congestion, and cough.  Fever since resolved.  Cough lingers and is occasionally productive but denies wheezing.  States completed course of oral steroids as prescribed.        Review of Systems   Review of Systems   Constitutional:  Positive for fatigue. Negative for chills and fever.   HENT:  Negative for congestion, ear discharge, ear pain, rhinorrhea and sore throat.    Eyes:  Negative for discharge.   Respiratory:  Positive for cough. Negative for shortness of breath and wheezing.    Cardiovascular:  Negative for chest pain.   Gastrointestinal:  Negative for abdominal pain, diarrhea, nausea and vomiting.   Skin:  Negative for rash.         Current Medications     Current Medications[1]    Current Allergies     Allergies as of 05/22/2025    (No Known Allergies)            The following portions of the patient's history were reviewed and updated as appropriate: allergies, current medications, past family history, past medical history, past social history, past surgical history and problem list.     Past Medical History[2]    Past Surgical History[3]    Family History[4]      Medications have been verified.        Objective   /84   Pulse 95   Temp 97.7 °F (36.5 °C)   Resp 19   Ht 5' 9\" (1.753 m)   Wt 136 kg (300 lb)   SpO2 97%   BMI 44.30 kg/m²   No LMP for male patient.       Physical Exam     Physical Exam  Vitals and nursing note reviewed.   Constitutional:       General: He is not in acute distress.     Appearance: He is not toxic-appearing.   HENT:      Head: Normocephalic.      Right Ear: Tympanic membrane, ear canal and external ear normal.      Left Ear: Tympanic membrane, ear canal and external ear normal.      Nose: Nose normal.      Mouth/Throat:      Mouth: Mucous membranes are moist.      Pharynx: Oropharynx is clear.     Eyes:      Conjunctiva/sclera: Conjunctivae normal.       Cardiovascular:      " "Rate and Rhythm: Normal rate and regular rhythm.      Heart sounds: Normal heart sounds.   Pulmonary:      Effort: Pulmonary effort is normal. No respiratory distress.      Breath sounds: Normal breath sounds. No stridor. No wheezing, rhonchi or rales.   Lymphadenopathy:      Cervical: No cervical adenopathy.     Skin:     General: Skin is warm and dry.     Neurological:      Mental Status: He is alert and oriented to person, place, and time.      Gait: Gait is intact.     Psychiatric:         Mood and Affect: Mood normal.         Behavior: Behavior normal.                        [1]   Current Outpatient Medications:     acetaminophen (TYLENOL) 500 mg tablet, Take 1,000 mg by mouth every 6 (six) hours as needed for mild pain, Disp: , Rfl:     atoMOXetine (STRATTERA) 40 mg capsule, Take 1 capsule (40 mg total) by mouth every morning, Disp: 90 capsule, Rfl: 1    azithromycin (ZITHROMAX) 250 mg tablet, Take 2 tablets today then 1 tablet daily x 4 days, Disp: 6 tablet, Rfl: 0    benzonatate (TESSALON PERLES) 100 mg capsule, Take 1 capsule (100 mg total) by mouth 3 (three) times a day as needed for cough for up to 7 days, Disp: 20 capsule, Rfl: 0    escitalopram (LEXAPRO) 20 mg tablet, take 1 tablet by mouth once daily, Disp: 90 tablet, Rfl: 1    guaiFENesin (MUCINEX) 600 mg 12 hr tablet, Take 1,200 mg by mouth every 12 (twelve) hours, Disp: , Rfl:     Elastic Bandages & Supports (Bandage Roll 4.5\" x 144\") MISC, Use 1 Units daily (Patient not taking: Reported on 5/22/2025), Disp: 1 each, Rfl: 1    Elastic Bandages & Supports (Elastic Bandage 4\") MISC, Use daily (Patient not taking: Reported on 5/22/2025), Disp: 1 each, Rfl: 2    fluticasone (FLONASE) 50 mcg/act nasal spray, 1 spray into each nostril daily (Patient not taking: Reported on 5/22/2025), Disp: 9.9 mL, Rfl: 0    Gauze Pads & Dressings (BRAND NEW GENERATION LARGE) PADS, Use daily (Patient not taking: Reported on 5/22/2025), Disp: 18 each, Rfl: 0    Gauze Pads & " "Dressings 4\"X10\" PADS, Use daily (Patient not taking: Reported on 5/22/2025), Disp: 10 each, Rfl: 1    ketoconazole (NIZORAL) 2 % cream, Apply topically daily (Patient not taking: Reported on 5/22/2025), Disp: 60 g, Rfl: 5    mupirocin (BACTROBAN) 2 % ointment, apply topically twice a day (Patient not taking: Reported on 5/22/2025), Disp: 22 g, Rfl: 0    ondansetron (ZOFRAN) 4 mg tablet, Take 1 tablet (4 mg total) by mouth every 8 (eight) hours as needed for nausea or vomiting (Patient not taking: Reported on 5/22/2025), Disp: 20 tablet, Rfl: 0    oxyCODONE (Roxicodone) 5 immediate release tablet, Take 1 tablet (5 mg total) by mouth every 6 (six) hours as needed for moderate pain Max Daily Amount: 20 mg (Patient not taking: Reported on 3/19/2025), Disp: 6 tablet, Rfl: 0  [2]   Past Medical History:  Diagnosis Date    Anxiety     Cellulitis     Depression     MRSA (methicillin resistant Staphylococcus aureus)    [3]   Past Surgical History:  Procedure Laterality Date    APPENDECTOMY  2019    HERNIA REPAIR      SHOULDER SURGERY     [4]   Family History  Problem Relation Name Age of Onset    Breast cancer Mother      Diabetes Mother      Bone cancer Father      Heart attack Paternal Grandmother      Breast cancer Maternal Aunt       "

## 2025-05-22 NOTE — PATIENT INSTRUCTIONS
Preliminary XR read negative, final read pending  Take antibiotic as prescribed  Continue with supportive measures, OTC Tylenol/Ibuprofen, nasal decongestants, and cough suppressants (Tessalon Perles)   Cool mist humidifiers, throat lozenges, salt gargles, honey, increased fluid intake and rest   Follow up with PCP in 3-5 days  Present to ER if symptoms worsen       If tests have been performed at [facility], our office will contact you with results if changes need to be made to the care plan discussed with you at the visit.  You can review your full results on [facility].

## 2025-06-05 DIAGNOSIS — F98.8 ATTENTION DEFICIT DISORDER (ADD) WITHOUT HYPERACTIVITY: ICD-10-CM

## 2025-06-05 NOTE — TELEPHONE ENCOUNTER
Patient called to check status of request, advised pending provider approval . He out of the medication and requesting a call back when complete

## 2025-06-05 NOTE — TELEPHONE ENCOUNTER
Patient out of medication    Reason for call:   [x] Refill   [] Prior Auth  [] Other:     Office:   [x] PCP/Provider - Dmitriy Simpson   [] Specialty/Provider -     Medication: atoMOXetine (STRATTERA) 40 mg capsule     Dose/Frequency: Take 1 capsule (40 mg total) by mouth every morning     Quantity: 90    Pharmacy: [facility]    Local Pharmacy   Does the patient have enough for 3 days?   [] Yes   [x] No - Send as HP to POD

## 2025-06-06 RX ORDER — ATOMOXETINE 40 MG/1
40 CAPSULE ORAL EVERY MORNING
Qty: 30 CAPSULE | Refills: 0 | Status: SHIPPED | OUTPATIENT
Start: 2025-06-06

## 2025-07-18 DIAGNOSIS — F98.8 ATTENTION DEFICIT DISORDER (ADD) WITHOUT HYPERACTIVITY: ICD-10-CM

## 2025-07-21 DIAGNOSIS — F98.8 ATTENTION DEFICIT DISORDER (ADD) WITHOUT HYPERACTIVITY: ICD-10-CM

## 2025-07-21 RX ORDER — ATOMOXETINE 40 MG/1
40 CAPSULE ORAL EVERY MORNING
Qty: 30 CAPSULE | Refills: 0 | OUTPATIENT
Start: 2025-07-21

## 2025-07-21 RX ORDER — ATOMOXETINE 40 MG/1
40 CAPSULE ORAL EVERY MORNING
Qty: 30 CAPSULE | Refills: 0 | Status: SHIPPED | OUTPATIENT
Start: 2025-07-21

## 2025-07-21 NOTE — TELEPHONE ENCOUNTER
Requested medication(s) are due for refill today: Yes  Patient has already received a courtesy refill: No  Other reason request has been forwarded to provider: overdue for appointment

## 2025-07-22 RX ORDER — ATOMOXETINE 40 MG/1
40 CAPSULE ORAL EVERY MORNING
Qty: 30 CAPSULE | Refills: 0 | Status: SHIPPED | OUTPATIENT
Start: 2025-07-22

## 2025-07-22 NOTE — TELEPHONE ENCOUNTER
Requested medication(s) are due for refill today: Yes  Patient has already received a courtesy refill: No  Other reason request has been forwarded to provider: Duplicate

## 2025-07-24 ENCOUNTER — APPOINTMENT (OUTPATIENT)
Dept: LAB | Facility: CLINIC | Age: 45
End: 2025-07-24
Payer: COMMERCIAL

## 2025-07-24 ENCOUNTER — OFFICE VISIT (OUTPATIENT)
Dept: FAMILY MEDICINE CLINIC | Facility: CLINIC | Age: 45
End: 2025-07-24
Payer: COMMERCIAL

## 2025-07-24 VITALS
OXYGEN SATURATION: 98 % | WEIGHT: 314.16 LBS | HEIGHT: 69 IN | DIASTOLIC BLOOD PRESSURE: 89 MMHG | HEART RATE: 76 BPM | BODY MASS INDEX: 46.53 KG/M2 | SYSTOLIC BLOOD PRESSURE: 134 MMHG | TEMPERATURE: 97.6 F

## 2025-07-24 DIAGNOSIS — Z12.12 SCREENING FOR COLORECTAL CANCER: ICD-10-CM

## 2025-07-24 DIAGNOSIS — Z12.5 SCREENING FOR PROSTATE CANCER: ICD-10-CM

## 2025-07-24 DIAGNOSIS — E66.813 CLASS 3 SEVERE OBESITY DUE TO EXCESS CALORIES WITHOUT SERIOUS COMORBIDITY WITH BODY MASS INDEX (BMI) OF 45.0 TO 49.9 IN ADULT: ICD-10-CM

## 2025-07-24 DIAGNOSIS — Z80.42 FAMILY HISTORY OF PROSTATE CANCER: ICD-10-CM

## 2025-07-24 DIAGNOSIS — E66.813 CLASS 3 SEVERE OBESITY DUE TO EXCESS CALORIES WITHOUT SERIOUS COMORBIDITY WITH BODY MASS INDEX (BMI) OF 45.0 TO 49.9 IN ADULT: Primary | ICD-10-CM

## 2025-07-24 DIAGNOSIS — Z76.89 ENCOUNTER TO ESTABLISH CARE: ICD-10-CM

## 2025-07-24 DIAGNOSIS — Z12.11 SCREENING FOR COLORECTAL CANCER: ICD-10-CM

## 2025-07-24 LAB
ALBUMIN SERPL BCG-MCNC: 4.4 G/DL (ref 3.5–5)
ALP SERPL-CCNC: 60 U/L (ref 34–104)
ALT SERPL W P-5'-P-CCNC: 39 U/L (ref 7–52)
ANION GAP SERPL CALCULATED.3IONS-SCNC: 8 MMOL/L (ref 4–13)
AST SERPL W P-5'-P-CCNC: 22 U/L (ref 13–39)
BILIRUB SERPL-MCNC: 0.63 MG/DL (ref 0.2–1)
BUN SERPL-MCNC: 23 MG/DL (ref 5–25)
CALCIUM SERPL-MCNC: 9.6 MG/DL (ref 8.4–10.2)
CHLORIDE SERPL-SCNC: 102 MMOL/L (ref 96–108)
CHOLEST SERPL-MCNC: 137 MG/DL (ref ?–200)
CO2 SERPL-SCNC: 28 MMOL/L (ref 21–32)
CREAT SERPL-MCNC: 0.72 MG/DL (ref 0.6–1.3)
GFR SERPL CREATININE-BSD FRML MDRD: 111 ML/MIN/1.73SQ M
GLUCOSE P FAST SERPL-MCNC: 94 MG/DL (ref 65–99)
HDLC SERPL-MCNC: 62 MG/DL
LDLC SERPL CALC-MCNC: 52 MG/DL (ref 0–100)
NONHDLC SERPL-MCNC: 75 MG/DL
POTASSIUM SERPL-SCNC: 4.4 MMOL/L (ref 3.5–5.3)
PROT SERPL-MCNC: 7.4 G/DL (ref 6.4–8.4)
PSA SERPL-MCNC: 0.53 NG/ML (ref 0–4)
SODIUM SERPL-SCNC: 138 MMOL/L (ref 135–147)
TRIGL SERPL-MCNC: 117 MG/DL (ref ?–150)

## 2025-07-24 PROCEDURE — 99215 OFFICE O/P EST HI 40 MIN: CPT | Performed by: PHYSICIAN ASSISTANT

## 2025-07-24 PROCEDURE — 80053 COMPREHEN METABOLIC PANEL: CPT

## 2025-07-24 PROCEDURE — 80061 LIPID PANEL: CPT

## 2025-07-24 PROCEDURE — G0103 PSA SCREENING: HCPCS

## 2025-07-24 PROCEDURE — 36415 COLL VENOUS BLD VENIPUNCTURE: CPT

## 2025-07-24 RX ORDER — NALTREXONE HYDROCHLORIDE 50 MG/1
TABLET, FILM COATED ORAL
Qty: 21 TABLET | Refills: 0 | Status: SHIPPED | OUTPATIENT
Start: 2025-07-24

## 2025-07-24 RX ORDER — BUPROPION HYDROCHLORIDE 100 MG/1
TABLET ORAL
Qty: 70 TABLET | Refills: 0 | Status: SHIPPED | OUTPATIENT
Start: 2025-07-24

## 2025-07-24 NOTE — PROGRESS NOTES
Name: Tristan Espino      : 1980      MRN: [id number]  Encounter Provider: Juanita Pappas PA-C  Encounter Date: 2025   Encounter department: [facility]  :  Assessment & Plan  Class 3 severe obesity due to excess calories without serious comorbidity with body mass index (BMI) of 45.0 to 49.9 in adult  The entire appointment today was with regard to discussion of weight loss.  Patient is interested in medication assistance as diet and exercise have failed.  Is 314 pounds with a BMI of 46.  He called his [employer] medical coverage team only to be told that they do not have prescription coverage through them he made several other phone calls to his work and his wife and then finally [employer] to find out who his company was.  He was then transferred to several different divisions within [employer] only to find out that obesity medications are not covered under his policy.  As part of shared decision making, patient is willing to try the generic form of Contrave.    Orders:    buPROPion (WELLBUTRIN) 100 mg tablet; Week 1: 1 tab in AM; Week 2: 1 tab in AM and 1 tab in PM; Week 3: 2 tabs in AM and 1 tab in PM; Week 4: 2 tabs in AM and 2 tabs in PM    naltrexone (REVIA) 50 mg tablet; 1/2 tab in AM for 2 weeks and week 3 thereafter: 1 tab in AM.    Ambulatory Referral to Gastroenterology; Future    Lipid panel; Future    Comprehensive metabolic panel; Future    Screening for colorectal cancer  Patient willing to have colonoscopy.  This was ordered a year ago but he never got it done and he is willing to have it done at this point.       Family history of prostate cancer  Patient's father and grandfather have a history of prostate cancer and he would like to be screened.  PSA ordered  Orders:    PSA, Total Screen; Future    Screening for prostate cancer  Prostate cancer screening in the form of PSA  Orders:    PSA, Total Screen; Future    Encounter to establish  "care  Patient formally followed with [facility] and wishes to establish care here.  He is going to get baseline labs in the form of a lipid profile and CMP.              History of Present Illness   HPI: 44-year-old male seen primarily to discuss weight loss options.  After a prolonged period of time, he found that he has no prescription coverage for any of the weight loss medications he is going to try the generic form of Contrave and is cost is going to be $10 for each medication per month.  He was counseled to get 60 g of protein and give enough fiber in his diet to prevent constipation since he will be eating less.    He went through the mechanism of action for the weight loss medication.  We also went through titration of this medication.    He is not having pain in his chest or heart palpitations.  Review of Systems: No recent URI or viral syndrome.    Objective   /89 (BP Location: Right arm, Patient Position: Sitting, Cuff Size: Standard)   Pulse 76   Temp 97.6 °F (36.4 °C) (Tympanic)   Ht 5' 9\" (1.753 m)   Wt (!) 143 kg (314 lb 2.5 oz)   SpO2 98%   BMI 46.39 kg/m²      Physical Exam: Reviewed vital signs.  Is normotensive and afebrile.    Heart is regular rate without murmur rub or gallop.  Lungs are clear to auscultation.  Administrative Statements   I have spent a total time of 40 minutes in caring for this patient on the day of the visit/encounter including Risks and benefits of tx options, Instructions for management, Patient and family education, Importance of tx compliance, Impressions, Counseling / Coordination of care, Documenting in the medical record, Reviewing/placing orders in the medical record (including tests, medications, and/or procedures), and Obtaining or reviewing history      I will see the patient in 1 month to assess his success in weight loss and to perform his history and physical and discuss his lab results..  "

## 2025-07-24 NOTE — ASSESSMENT & PLAN NOTE
The entire appointment today was with regard to discussion of weight loss.  Patient is interested in medication assistance as diet and exercise have failed.  Is 314 pounds with a BMI of 46.  He called his [employer] medical coverage team only to be told that they do not have prescription coverage through them he made several other phone calls to his work and his wife and then finally [employer] to find out who his company was.  He was then transferred to several different divisions within [facility] only to find out that obesity medications are not covered under his policy.  As part of shared decision making, patient is willing to try the generic form of Contrave.    Orders:    buPROPion (WELLBUTRIN) 100 mg tablet; Week 1: 1 tab in AM; Week 2: 1 tab in AM and 1 tab in PM; Week 3: 2 tabs in AM and 1 tab in PM; Week 4: 2 tabs in AM and 2 tabs in PM    naltrexone (REVIA) 50 mg tablet; 1/2 tab in AM for 2 weeks and week 3 thereafter: 1 tab in AM.    Ambulatory Referral to Gastroenterology; Future    Lipid panel; Future    Comprehensive metabolic panel; Future

## 2025-07-25 ENCOUNTER — TELEPHONE (OUTPATIENT)
Age: 45
End: 2025-07-25

## 2025-07-29 ENCOUNTER — TELEPHONE (OUTPATIENT)
Age: 45
End: 2025-07-29

## 2025-07-29 ENCOUNTER — TELEPHONE (OUTPATIENT)
Dept: FAMILY MEDICINE CLINIC | Facility: CLINIC | Age: 45
End: 2025-07-29

## 2025-08-05 DIAGNOSIS — Z12.12 SCREENING FOR COLORECTAL CANCER: Primary | ICD-10-CM

## 2025-08-05 DIAGNOSIS — Z12.11 SCREENING FOR COLORECTAL CANCER: Primary | ICD-10-CM
